# Patient Record
Sex: FEMALE | Race: BLACK OR AFRICAN AMERICAN | NOT HISPANIC OR LATINO | Employment: UNEMPLOYED | URBAN - METROPOLITAN AREA
[De-identification: names, ages, dates, MRNs, and addresses within clinical notes are randomized per-mention and may not be internally consistent; named-entity substitution may affect disease eponyms.]

---

## 2021-09-17 ENCOUNTER — APPOINTMENT (EMERGENCY)
Dept: CT IMAGING | Facility: HOSPITAL | Age: 46
DRG: 988 | End: 2021-09-17

## 2021-09-17 ENCOUNTER — APPOINTMENT (EMERGENCY)
Dept: RADIOLOGY | Facility: HOSPITAL | Age: 46
DRG: 988 | End: 2021-09-17

## 2021-09-17 ENCOUNTER — HOSPITAL ENCOUNTER (INPATIENT)
Facility: HOSPITAL | Age: 46
LOS: 2 days | Discharge: HOME | DRG: 988 | End: 2021-09-20
Attending: EMERGENCY MEDICINE | Admitting: INTERNAL MEDICINE

## 2021-09-17 DIAGNOSIS — R63.4 UNINTENTIONAL WEIGHT LOSS: ICD-10-CM

## 2021-09-17 DIAGNOSIS — R74.01 TRANSAMINITIS: ICD-10-CM

## 2021-09-17 DIAGNOSIS — J18.9 PNEUMONIA: ICD-10-CM

## 2021-09-17 DIAGNOSIS — R59.1 DIFFUSE LYMPHADENOPATHY: ICD-10-CM

## 2021-09-17 DIAGNOSIS — R91.8 BILATERAL PULMONARY INFILTRATES ON CHEST X-RAY: Primary | ICD-10-CM

## 2021-09-17 DIAGNOSIS — E04.1 THYROID NODULE GREATER THAN OR EQUAL TO 1.5 CM IN DIAMETER INCIDENTALLY NOTED ON IMAGING STUDY: ICD-10-CM

## 2021-09-17 DIAGNOSIS — R53.1 GENERALIZED WEAKNESS: ICD-10-CM

## 2021-09-17 LAB
ALBUMIN SERPL BCP-MCNC: 2.4 G/DL (ref 3.5–5)
ALP SERPL-CCNC: 187 U/L (ref 46–116)
ALT SERPL W P-5'-P-CCNC: 79 U/L (ref 12–78)
ANION GAP SERPL CALCULATED.3IONS-SCNC: 6 MMOL/L (ref 4–13)
ANISOCYTOSIS BLD QL SMEAR: PRESENT
AST SERPL W P-5'-P-CCNC: 240 U/L (ref 5–45)
BACTERIA UR QL AUTO: NORMAL /HPF
BASOPHILS # BLD MANUAL: 0 THOUSAND/UL (ref 0–0.1)
BASOPHILS NFR MAR MANUAL: 0 % (ref 0–1)
BILIRUB SERPL-MCNC: 0.37 MG/DL (ref 0.2–1)
BILIRUB UR QL STRIP: NEGATIVE
BUN SERPL-MCNC: 11 MG/DL (ref 5–25)
CALCIUM ALBUM COR SERPL-MCNC: 10.1 MG/DL (ref 8.3–10.1)
CALCIUM SERPL-MCNC: 8.8 MG/DL (ref 8.3–10.1)
CHLORIDE SERPL-SCNC: 106 MMOL/L (ref 100–108)
CK SERPL-CCNC: 197 U/L (ref 26–192)
CLARITY UR: CLEAR
CO2 SERPL-SCNC: 27 MMOL/L (ref 21–32)
COLOR UR: YELLOW
CREAT SERPL-MCNC: 0.77 MG/DL (ref 0.6–1.3)
D DIMER PPP FEU-MCNC: 3.73 UG/ML FEU
DACRYOCYTES BLD QL SMEAR: PRESENT
EOSINOPHIL # BLD MANUAL: 0 THOUSAND/UL (ref 0–0.4)
EOSINOPHIL NFR BLD MANUAL: 0 % (ref 0–6)
ERYTHROCYTE [DISTWIDTH] IN BLOOD BY AUTOMATED COUNT: 25.6 % (ref 11.6–15.1)
EXT PREG TEST URINE: NEGATIVE
EXT. CONTROL ED NAV: NORMAL
GFR SERPL CREATININE-BSD FRML MDRD: 106 ML/MIN/1.73SQ M
GLUCOSE SERPL-MCNC: 88 MG/DL (ref 65–140)
GLUCOSE UR STRIP-MCNC: NEGATIVE MG/DL
HCT VFR BLD AUTO: 28.8 % (ref 34.8–46.1)
HGB BLD-MCNC: 9.4 G/DL (ref 11.5–15.4)
HGB UR QL STRIP.AUTO: ABNORMAL
KETONES UR STRIP-MCNC: NEGATIVE MG/DL
LEUKOCYTE ESTERASE UR QL STRIP: NEGATIVE
LIPASE SERPL-CCNC: 431 U/L (ref 73–393)
LYMPHOCYTES # BLD AUTO: 0.79 THOUSAND/UL (ref 0.6–4.47)
LYMPHOCYTES # BLD AUTO: 30 % (ref 14–44)
MCH RBC QN AUTO: 30.3 PG (ref 26.8–34.3)
MCHC RBC AUTO-ENTMCNC: 32.6 G/DL (ref 31.4–37.4)
MCV RBC AUTO: 93 FL (ref 82–98)
MONOCYTES # BLD AUTO: 0.13 THOUSAND/UL (ref 0–1.22)
MONOCYTES NFR BLD: 5 % (ref 4–12)
NEUTROPHILS # BLD MANUAL: 1.63 THOUSAND/UL (ref 1.85–7.62)
NEUTS SEG NFR BLD AUTO: 62 % (ref 43–75)
NITRITE UR QL STRIP: NEGATIVE
NON-SQ EPI CELLS URNS QL MICRO: NORMAL /HPF
PH UR STRIP.AUTO: 6.5 [PH] (ref 4.5–8)
PLATELET # BLD AUTO: 79 THOUSANDS/UL (ref 149–390)
PLATELET BLD QL SMEAR: ABNORMAL
POTASSIUM SERPL-SCNC: 4.1 MMOL/L (ref 3.5–5.3)
PROT SERPL-MCNC: 8.7 G/DL (ref 6.4–8.2)
PROT UR STRIP-MCNC: NEGATIVE MG/DL
RBC # BLD AUTO: 3.1 MILLION/UL (ref 3.81–5.12)
RBC #/AREA URNS AUTO: NORMAL /HPF
RBC MORPH BLD: PRESENT
ROULEAUX BLD QL SMEAR: PRESENT
SARS-COV-2 RNA RESP QL NAA+PROBE: NEGATIVE
SODIUM SERPL-SCNC: 139 MMOL/L (ref 136–145)
SP GR UR STRIP.AUTO: 1.02 (ref 1–1.03)
TARGETS BLD QL SMEAR: PRESENT
UROBILINOGEN UR QL STRIP.AUTO: 0.2 E.U./DL
VARIANT LYMPHS # BLD AUTO: 3 %
WBC # BLD AUTO: 2.63 THOUSAND/UL (ref 4.31–10.16)
WBC #/AREA URNS AUTO: NORMAL /HPF

## 2021-09-17 PROCEDURE — 80053 COMPREHEN METABOLIC PANEL: CPT

## 2021-09-17 PROCEDURE — 85379 FIBRIN DEGRADATION QUANT: CPT

## 2021-09-17 PROCEDURE — 82550 ASSAY OF CK (CPK): CPT

## 2021-09-17 PROCEDURE — 84166 PROTEIN E-PHORESIS/URINE/CSF: CPT

## 2021-09-17 PROCEDURE — 83690 ASSAY OF LIPASE: CPT

## 2021-09-17 PROCEDURE — 99285 EMERGENCY DEPT VISIT HI MDM: CPT | Performed by: EMERGENCY MEDICINE

## 2021-09-17 PROCEDURE — 71045 X-RAY EXAM CHEST 1 VIEW: CPT

## 2021-09-17 PROCEDURE — 36415 COLL VENOUS BLD VENIPUNCTURE: CPT

## 2021-09-17 PROCEDURE — 81001 URINALYSIS AUTO W/SCOPE: CPT

## 2021-09-17 PROCEDURE — 85027 COMPLETE CBC AUTOMATED: CPT

## 2021-09-17 PROCEDURE — 83605 ASSAY OF LACTIC ACID: CPT

## 2021-09-17 PROCEDURE — 81025 URINE PREGNANCY TEST: CPT

## 2021-09-17 PROCEDURE — 82553 CREATINE MB FRACTION: CPT

## 2021-09-17 PROCEDURE — G1004 CDSM NDSC: HCPCS

## 2021-09-17 PROCEDURE — 99285 EMERGENCY DEPT VISIT HI MDM: CPT

## 2021-09-17 PROCEDURE — 71275 CT ANGIOGRAPHY CHEST: CPT

## 2021-09-17 PROCEDURE — 85007 BL SMEAR W/DIFF WBC COUNT: CPT

## 2021-09-17 PROCEDURE — 87040 BLOOD CULTURE FOR BACTERIA: CPT

## 2021-09-17 PROCEDURE — U0003 INFECTIOUS AGENT DETECTION BY NUCLEIC ACID (DNA OR RNA); SEVERE ACUTE RESPIRATORY SYNDROME CORONAVIRUS 2 (SARS-COV-2) (CORONAVIRUS DISEASE [COVID-19]), AMPLIFIED PROBE TECHNIQUE, MAKING USE OF HIGH THROUGHPUT TECHNOLOGIES AS DESCRIBED BY CMS-2020-01-R: HCPCS

## 2021-09-17 PROCEDURE — 93005 ELECTROCARDIOGRAM TRACING: CPT

## 2021-09-17 PROCEDURE — U0005 INFEC AGEN DETEC AMPLI PROBE: HCPCS

## 2021-09-17 PROCEDURE — 74177 CT ABD & PELVIS W/CONTRAST: CPT

## 2021-09-17 RX ORDER — SODIUM CHLORIDE 9 MG/ML
75 INJECTION, SOLUTION INTRAVENOUS CONTINUOUS
Status: DISCONTINUED | OUTPATIENT
Start: 2021-09-17 | End: 2021-09-20 | Stop reason: HOSPADM

## 2021-09-17 RX ORDER — HYDROMORPHONE HCL/PF 1 MG/ML
0.5 SYRINGE (ML) INJECTION ONCE
Status: COMPLETED | OUTPATIENT
Start: 2021-09-17 | End: 2021-09-18

## 2021-09-18 ENCOUNTER — APPOINTMENT (INPATIENT)
Dept: CT IMAGING | Facility: HOSPITAL | Age: 46
DRG: 988 | End: 2021-09-18

## 2021-09-18 PROBLEM — E04.1 THYROID NODULE GREATER THAN OR EQUAL TO 1.5 CM IN DIAMETER INCIDENTALLY NOTED ON IMAGING STUDY: Status: ACTIVE | Noted: 2021-09-18

## 2021-09-18 PROBLEM — R63.4 UNINTENTIONAL WEIGHT LOSS: Status: ACTIVE | Noted: 2021-09-18

## 2021-09-18 PROBLEM — E87.6 HYPOKALEMIA: Status: ACTIVE | Noted: 2021-09-18

## 2021-09-18 PROBLEM — D61.818 PANCYTOPENIA (HCC): Status: ACTIVE | Noted: 2021-09-18

## 2021-09-18 PROBLEM — R74.01 TRANSAMINITIS: Status: ACTIVE | Noted: 2021-09-18

## 2021-09-18 PROBLEM — R91.8 PULMONARY INFILTRATES: Status: ACTIVE | Noted: 2021-09-18

## 2021-09-18 PROBLEM — R53.1 GENERALIZED WEAKNESS: Status: ACTIVE | Noted: 2021-09-18

## 2021-09-18 LAB
ALBUMIN SERPL BCP-MCNC: 2.4 G/DL (ref 3.5–5)
ALP SERPL-CCNC: 183 U/L (ref 46–116)
ALT SERPL W P-5'-P-CCNC: 76 U/L (ref 12–78)
ANION GAP SERPL CALCULATED.3IONS-SCNC: 7 MMOL/L (ref 4–13)
ANISOCYTOSIS BLD QL SMEAR: PRESENT
APAP SERPL-MCNC: <2 UG/ML (ref 10–20)
AST SERPL W P-5'-P-CCNC: 228 U/L (ref 5–45)
BASOPHILS # BLD MANUAL: 0 THOUSAND/UL (ref 0–0.1)
BASOPHILS NFR MAR MANUAL: 0 % (ref 0–1)
BILIRUB SERPL-MCNC: 0.6 MG/DL (ref 0.2–1)
BUN SERPL-MCNC: 9 MG/DL (ref 5–25)
CALCIUM ALBUM COR SERPL-MCNC: 9.9 MG/DL (ref 8.3–10.1)
CALCIUM SERPL-MCNC: 8.6 MG/DL (ref 8.3–10.1)
CHLORIDE SERPL-SCNC: 106 MMOL/L (ref 100–108)
CK MB SERPL-MCNC: 1.9 % (ref 0–2.5)
CK MB SERPL-MCNC: 3.8 NG/ML (ref 0–5)
CO2 SERPL-SCNC: 26 MMOL/L (ref 21–32)
CREAT SERPL-MCNC: 0.67 MG/DL (ref 0.6–1.3)
CRP SERPL QL: 18.2 MG/L
EOSINOPHIL # BLD MANUAL: 0 THOUSAND/UL (ref 0–0.4)
EOSINOPHIL NFR BLD MANUAL: 0 % (ref 0–6)
ERYTHROCYTE [DISTWIDTH] IN BLOOD BY AUTOMATED COUNT: 25.5 % (ref 11.6–15.1)
ERYTHROCYTE [SEDIMENTATION RATE] IN BLOOD: 106 MM/HOUR (ref 0–19)
ETHANOL SERPL-MCNC: <3 MG/DL (ref 0–3)
GFR SERPL CREATININE-BSD FRML MDRD: 120 ML/MIN/1.73SQ M
GLUCOSE SERPL-MCNC: 78 MG/DL (ref 65–140)
HAV IGM SER QL: NORMAL
HBV CORE IGM SER QL: NORMAL
HBV SURFACE AG SER QL: NORMAL
HCT VFR BLD AUTO: 30.8 % (ref 34.8–46.1)
HCV AB SER QL: NORMAL
HGB BLD-MCNC: 10 G/DL (ref 11.5–15.4)
HIV 1+2 AB+HIV1 P24 AG SERPL QL IA: NORMAL
HIV1 P24 AG SER QL: NORMAL
HYPERCHROMIA BLD QL SMEAR: PRESENT
LACTATE SERPL-SCNC: 0.9 MMOL/L (ref 0.5–2)
LYMPHOCYTES # BLD AUTO: 0.94 THOUSAND/UL (ref 0.6–4.47)
LYMPHOCYTES # BLD AUTO: 36 % (ref 14–44)
MCH RBC QN AUTO: 30.6 PG (ref 26.8–34.3)
MCHC RBC AUTO-ENTMCNC: 32.5 G/DL (ref 31.4–37.4)
MCV RBC AUTO: 94 FL (ref 82–98)
MONOCYTES # BLD AUTO: 0.13 THOUSAND/UL (ref 0–1.22)
MONOCYTES NFR BLD: 5 % (ref 4–12)
NEUTROPHILS # BLD MANUAL: 1.49 THOUSAND/UL (ref 1.85–7.62)
NEUTS BAND NFR BLD MANUAL: 1 % (ref 0–8)
NEUTS SEG NFR BLD AUTO: 56 % (ref 43–75)
NRBC BLD AUTO-RTO: 1 /100 WBC (ref 0–2)
PLATELET # BLD AUTO: 76 THOUSANDS/UL (ref 149–390)
PLATELET # BLD AUTO: 79 THOUSANDS/UL (ref 149–390)
PLATELET BLD QL SMEAR: ABNORMAL
POIKILOCYTOSIS BLD QL SMEAR: PRESENT
POTASSIUM SERPL-SCNC: 3.3 MMOL/L (ref 3.5–5.3)
PROT SERPL-MCNC: 8.5 G/DL (ref 6.4–8.2)
RBC # BLD AUTO: 3.27 MILLION/UL (ref 3.81–5.12)
RBC MORPH BLD: PRESENT
SODIUM SERPL-SCNC: 139 MMOL/L (ref 136–145)
TARGETS BLD QL SMEAR: PRESENT
TROPONIN I SERPL-MCNC: <0.02 NG/ML
TSH SERPL DL<=0.05 MIU/L-ACNC: 1.64 UIU/ML (ref 0.36–3.74)
VARIANT LYMPHS # BLD AUTO: 2 %
WBC # BLD AUTO: 2.61 THOUSAND/UL (ref 4.31–10.16)

## 2021-09-18 PROCEDURE — 84145 PROCALCITONIN (PCT): CPT

## 2021-09-18 PROCEDURE — 85652 RBC SED RATE AUTOMATED: CPT

## 2021-09-18 PROCEDURE — 80074 ACUTE HEPATITIS PANEL: CPT

## 2021-09-18 PROCEDURE — 86592 SYPHILIS TEST NON-TREP QUAL: CPT

## 2021-09-18 PROCEDURE — 86644 CMV ANTIBODY: CPT

## 2021-09-18 PROCEDURE — 86431 RHEUMATOID FACTOR QUANT: CPT

## 2021-09-18 PROCEDURE — 82077 ASSAY SPEC XCP UR&BREATH IA: CPT

## 2021-09-18 PROCEDURE — 86645 CMV ANTIBODY IGM: CPT

## 2021-09-18 PROCEDURE — 85007 BL SMEAR W/DIFF WBC COUNT: CPT

## 2021-09-18 PROCEDURE — 86480 TB TEST CELL IMMUN MEASURE: CPT

## 2021-09-18 PROCEDURE — 99223 1ST HOSP IP/OBS HIGH 75: CPT | Performed by: INTERNAL MEDICINE

## 2021-09-18 PROCEDURE — 86430 RHEUMATOID FACTOR TEST QUAL: CPT

## 2021-09-18 PROCEDURE — 87806 HIV AG W/HIV1&2 ANTB W/OPTIC: CPT

## 2021-09-18 PROCEDURE — 84439 ASSAY OF FREE THYROXINE: CPT

## 2021-09-18 PROCEDURE — 86334 IMMUNOFIX E-PHORESIS SERUM: CPT | Performed by: PATHOLOGY

## 2021-09-18 PROCEDURE — 86038 ANTINUCLEAR ANTIBODIES: CPT

## 2021-09-18 PROCEDURE — 84484 ASSAY OF TROPONIN QUANT: CPT

## 2021-09-18 PROCEDURE — 86334 IMMUNOFIX E-PHORESIS SERUM: CPT

## 2021-09-18 PROCEDURE — 86747 PARVOVIRUS ANTIBODY: CPT

## 2021-09-18 PROCEDURE — 96374 THER/PROPH/DIAG INJ IV PUSH: CPT

## 2021-09-18 PROCEDURE — 86780 TREPONEMA PALLIDUM: CPT

## 2021-09-18 PROCEDURE — 84165 PROTEIN E-PHORESIS SERUM: CPT

## 2021-09-18 PROCEDURE — 86593 SYPHILIS TEST NON-TREP QUANT: CPT

## 2021-09-18 PROCEDURE — 85049 AUTOMATED PLATELET COUNT: CPT

## 2021-09-18 PROCEDURE — 80143 DRUG ASSAY ACETAMINOPHEN: CPT

## 2021-09-18 PROCEDURE — 84165 PROTEIN E-PHORESIS SERUM: CPT | Performed by: PATHOLOGY

## 2021-09-18 PROCEDURE — 80053 COMPREHEN METABOLIC PANEL: CPT

## 2021-09-18 PROCEDURE — 88184 FLOWCYTOMETRY/ TC 1 MARKER: CPT | Performed by: INTERNAL MEDICINE

## 2021-09-18 PROCEDURE — 86140 C-REACTIVE PROTEIN: CPT

## 2021-09-18 PROCEDURE — 84443 ASSAY THYROID STIM HORMONE: CPT

## 2021-09-18 PROCEDURE — 84166 PROTEIN E-PHORESIS/URINE/CSF: CPT | Performed by: PATHOLOGY

## 2021-09-18 PROCEDURE — 86665 EPSTEIN-BARR CAPSID VCA: CPT

## 2021-09-18 PROCEDURE — 85027 COMPLETE CBC AUTOMATED: CPT

## 2021-09-18 PROCEDURE — 86039 ANTINUCLEAR ANTIBODIES (ANA): CPT

## 2021-09-18 PROCEDURE — 96361 HYDRATE IV INFUSION ADD-ON: CPT

## 2021-09-18 PROCEDURE — 36415 COLL VENOUS BLD VENIPUNCTURE: CPT

## 2021-09-18 PROCEDURE — 86664 EPSTEIN-BARR NUCLEAR ANTIGEN: CPT

## 2021-09-18 PROCEDURE — 86663 EPSTEIN-BARR ANTIBODY: CPT

## 2021-09-18 RX ORDER — ACETAMINOPHEN 325 MG/1
650 TABLET ORAL EVERY 6 HOURS PRN
Status: DISCONTINUED | OUTPATIENT
Start: 2021-09-18 | End: 2021-09-20 | Stop reason: HOSPADM

## 2021-09-18 RX ORDER — POTASSIUM CHLORIDE 20 MEQ/1
40 TABLET, EXTENDED RELEASE ORAL ONCE
Status: COMPLETED | OUTPATIENT
Start: 2021-09-18 | End: 2021-09-18

## 2021-09-18 RX ORDER — OXYCODONE HYDROCHLORIDE 5 MG/1
2.5 TABLET ORAL EVERY 6 HOURS PRN
Status: DISCONTINUED | OUTPATIENT
Start: 2021-09-18 | End: 2021-09-20 | Stop reason: HOSPADM

## 2021-09-18 RX ORDER — GABAPENTIN 100 MG/1
100 CAPSULE ORAL 3 TIMES DAILY
Status: DISCONTINUED | OUTPATIENT
Start: 2021-09-18 | End: 2021-09-20 | Stop reason: HOSPADM

## 2021-09-18 RX ADMIN — Medication 1000 MG: at 05:12

## 2021-09-18 RX ADMIN — SODIUM CHLORIDE 1000 ML: 0.9 INJECTION, SOLUTION INTRAVENOUS at 01:16

## 2021-09-18 RX ADMIN — OXYCODONE HYDROCHLORIDE 2.5 MG: 5 TABLET ORAL at 14:10

## 2021-09-18 RX ADMIN — ACETAMINOPHEN 650 MG: 325 TABLET, FILM COATED ORAL at 22:09

## 2021-09-18 RX ADMIN — IOHEXOL 100 ML: 350 INJECTION, SOLUTION INTRAVENOUS at 01:57

## 2021-09-18 RX ADMIN — POTASSIUM CHLORIDE 40 MEQ: 1500 TABLET, EXTENDED RELEASE ORAL at 09:30

## 2021-09-18 RX ADMIN — ACETAMINOPHEN 650 MG: 325 TABLET, FILM COATED ORAL at 14:10

## 2021-09-18 RX ADMIN — GABAPENTIN 100 MG: 100 CAPSULE ORAL at 20:26

## 2021-09-18 RX ADMIN — SODIUM CHLORIDE 125 ML/HR: 0.9 INJECTION, SOLUTION INTRAVENOUS at 05:12

## 2021-09-18 RX ADMIN — HYDROMORPHONE HYDROCHLORIDE 0.5 MG: 1 INJECTION, SOLUTION INTRAMUSCULAR; INTRAVENOUS; SUBCUTANEOUS at 01:12

## 2021-09-18 RX ADMIN — DOXYCYCLINE 100 MG: 100 INJECTION, POWDER, LYOPHILIZED, FOR SOLUTION INTRAVENOUS at 06:00

## 2021-09-18 RX ADMIN — GABAPENTIN 100 MG: 100 CAPSULE ORAL at 15:18

## 2021-09-18 NOTE — ASSESSMENT & PLAN NOTE
CTA chest/abd/pelvis: Bilateral upper lobe infiltrates larger on the right  Unclear if this is infectious in nature  Patient endorses mild cough and congestion for the past few days  Patient did receive ceftriaxone and doxycycline in the ED       Plan:  - hold antibiotics for now   - monitor for fever  - f/u quant gold

## 2021-09-18 NOTE — ED ATTENDING ATTESTATION
2021  IKirt MD, saw and evaluated the patient  I have discussed the patient with the resident/non-physician practitioner and agree with the resident's/non-physician practitioner's findings, Plan of Care, and MDM as documented in the resident's/non-physician practitioner's note, except where noted  All available labs and Radiology studies were reviewed  I was present for key portions of any procedure(s) performed by the resident/non-physician practitioner and I was immediately available to provide assistance  At this point I agree with the current assessment done in the Emergency Department  I have conducted an independent evaluation of this patient a history and physical is as follows: Patient is a 48year old female with worsening diffuse aches and pains since this past Tuesday with abdominal pain and rash of R hand  Is visiting her from [facility] recently  No cough  No sob or chest pain  No fever  No known bites  Has had prior   LMP - July  No recent old records from this ED seen on computer system  [facility] on this patient and no Rx found  (+) tachy  No murmur  (+) dark rash noted palmar aspect of R hand  Lungs clear  Abdomen diffusely tender  Good bowel sounds  No LE edema  DDx including but not limited to: appendicitis, gastroenteritis, gastritis, PUD, GERD, gastroparesis, hepatitis, pancreatitis, colitis, enteritis, diverticulitis, food poisoning, mesenteric adenitis, mesenteric ischemia, IBD, IBS, ileus, bowel obstruction, volvulus, internal hernia, AAA, cholecystitis, biliary colic, choledocholithiasis, perforated viscus, tumor, splenic etiology, constipation, pelvic pathology, renal colic, pyelonephritis, UTI, PE, endocarditis, metabolic abnormality, rhabdomyolysis, arthritis, myositis, fibromyalgia, COVID 23; doubt cardiac etiology or Lyme disease or septic arthritis or meningitis or meningococcemia   Will check labs and CXR and EKG and CT and give pain medication       ED Course         Critical Care Time  Procedures

## 2021-09-18 NOTE — ASSESSMENT & PLAN NOTE
WBC 2 63, Hgb 9 4, platelet 79  Patient with no active signs of bleeding, no petechiae or purpura  Unknown etiology, need to rule out hematologic malignancy, infectious etiology, autoimmune etiology    Plan:  - trend CBC  - monitor H&H and transfuse for hgb <7

## 2021-09-18 NOTE — H&P
[facility]  [facility]- Michell Ruiz 1975, 48 y o  female MRN: [id number]  Unit/Bed#: ED 08 Encounter: [id number]  Primary Care Provider: No primary care provider on file  Date and time admitted to hospital: 9/17/2021  8:48 PM    * Generalized weakness  Assessment & Plan  Patient endorses 20 lb weigh loss over last year  Also endorses night sweats for the last few months and significant hair loss  Flew to the [address] from her home country of [place] [facility] on Monday to visit her cousin, with whom she is staying  On Tuesday after arriving here, patient developed arthralgias of the bilateral hands, wrists, toes, and left knee  No joint swelling on exam though significant tenderness of MCPs, wrists  Developed concurrent abdominal pain of the lower abdomen that she describes as cramping in nature  Denies any diarrhea, constipation, abnormal vaginal bleeding or discharge  Additionally, at this time the patient developed generalized weakness so severe that she sustained a fall yesterday with headstrike of the left temporal region, no loss of consciousness  Since that fall, the patient has been suffering from left sided headaches  Endorses mild cough and congestion for the last few days  Is fully vaccinated against COVID  Unsure of her childhood vaccine status  At this point, the etiology of the patient's symptoms is unclear  Patient is pancytopenic with WBC 2 63, Hgb 9 4, platelet 79  Mild transaminitis with , ALT 79,   Serum protein elevated at 8 7 with low serum albumin of 2 4  Positive D-dimer 3 73  CTA chest/abdomen/pelvis with no evidence of PE, though adenopathy is seen throughout the visualized anatomy including in the bilateral axilla, bilateral hilar, mediastinum, retroperitoneum, and bilateral iliac chain of unknown origin       Differential includes infectious etiology such as hepatitis, parvovirus B19, tuberculosis, EBV, CMV, HIV; autoimmune disease including but not limited to SLE, sarcoidosis, rheumatoid arthritis; and strong suspicion for possible hematologic malignancy given presence of B symptoms and diffuse lymphadenopathy  Patient with recent flight, decompression disease (Caison disease) could be contributing to arthralgias and abdominal pain  Plan:  - IVF with NS @ 75 cc/hr  - CT head without contrast due to headstrike, need to r/o intracranial pathology   - Infectious workup: EBV, CMP, hepatitis panel, RPR, HIV, quantiferon gold  - Autoimmune workup: CRP, ESR, PRATEEK, rheumatoid factor  - Neoplasm workup: SPEP, UPEP  - consider consultation of hematology-oncology, infectious disease for further evaluation       Pancytopenia ([facility] [address] )  Assessment & Plan  WBC 2 63, Hgb 9 4, platelet 79  Patient with no active signs of bleeding, no petechiae or purpura  Unknown etiology, need to rule out hematologic malignancy, infectious etiology, autoimmune etiology    Plan:  - trend CBC  - monitor H&H and transfuse for hgb <7    Pulmonary infiltrates  Assessment & Plan  CTA chest/abd/pelvis: Bilateral upper lobe infiltrates larger on the right  Unclear if this is infectious in nature  Patient endorses mild cough and congestion for the past few days  Patient did receive ceftriaxone and doxycycline in the ED  Plan:  - hold antibiotics for now   - monitor for fever  - f/u quant gold     Transaminitis  Assessment & Plan  , ALT 79,   Patient reports no significant alcohol use   Has been taking herbal supplement since March 2021, unclear what is in the supplement, has not taken in a few weeks  Plan:  - monitor CMP   - avoid hepatotoxins   - hepatitis panel     Unintentional weight loss  Assessment & Plan  Patient reports unintentional weight loss of approximately 20 lbs over the past year  Began seeing an herbal specialist in her home country of [place] [facility] for these symptoms and was given an herbal supplement that she started taking in March of 2021   Unsure what is in the herbal supplement, but has not taken it over a few weeks  Endorses poor appetite prior to arrival in the [address] on Monday  Per her cousin at bedside, she has had a good appetite since arriving here  Plan:  - nutrition consult     Thyroid nodule greater than or equal to 1 5 cm in diameter incidentally noted on imaging study  Assessment & Plan  3 4 x 2 5 cm nodule in the right thyroid lobe  Incidental discovery of one or more thyroid nodule(s) measuring more than 1 5 cm and without suspicious features is noted in this patient who is above 28years old; according to guidelines published in the   February 2015 white paper on incidental thyroid nodules in the Journal of the [employer] of Radiology [facility]), further characterization with thyroid ultrasound is recommended  Plan:  - f/u TSH and free T4  - thyroid u/s outpatient    VTE Pharmacologic Prophylaxis: VTE Score: 3 Moderate Risk (Score 3-4) - Pharmacological DVT Prophylaxis Ordered: enoxaparin (Lovenox)  Code Status: Level 1 - Full Code   Discussion with family: Updated  (cousin Lori Mar ) at bedside  Anticipated Length of Stay: Patient will be admitted on an inpatient basis with an anticipated length of stay of greater than 2 midnights secondary to Complex clinical picture  Chief Complaint: generalized weakness, joint pains, abdominal pain     History of Present Illness:  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 48 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  female with no significant PMH who presents with new onset arthralgias, crampy abdominal pain, and generalized weakness since arriving in the [address] from [place], where she resides  Patient endorses 20 lb unintentional weight loss over last year  Also endorses night sweats for the last few months and significant hair loss  Flew to the [address] from her home country of [place] on Monday to visit her cousin, with whom she is staying  On Tuesday after arriving here, patient developed arthralgias of the bilateral hands, wrists, toes, and left knee  No joint swelling on exam though significant tenderness of MCPs, wrists  Also endorses numbness and tingling of her extremities and painless hyperpigmented lesions on the right palm and thumb  Additionally, patient endorses "darkening" of the skin of her ears  Developed concurrent abdominal pain of the lower abdomen that she describes as cramping in nature  Denies any diarrhea, constipation, abnormal vaginal bleeding or discharge  Additionally, at this time the patient developed generalized weakness so severe that she sustained a fall yesterday with headstrike of the left temporal region, no loss of consciousness  Since that fall, the patient has been suffering from left sided headaches  Endorses mild cough and congestion for the last few days  Is fully vaccinated against COVID  Unsure of her childhood vaccine status  When she first began to experience B symptoms approximately one year ago, she began visiting an herbal practitioner in [facility]  They placed her on an herbal supplement which she has been taking daily up until a few weeks ago  They also recommended she adhere to a gluten free and vegetarian diet, which she has been following for the last few months  She does note that her weight loss began prior to making these dietary changes  At this point, the etiology of the patient's symptoms is unclear  Patient is pancytopenic with WBC 2 63, Hgb 9 4, platelet 79  Mild transaminitis with , ALT 79,   Serum protein elevated at 8 7 with low serum albumin of 2 4  Positive D-dimer 3 73  CTA chest/abdomen/pelvis with no evidence of PE, though adenopathy is seen throughout the visualized anatomy including in the bilateral axilla, bilateral hilar, mediastinum, retroperitoneum, and bilateral iliac chain of unknown origin  Review of Systems:  Review of Systems   Constitutional: Positive for appetite change, chills (at night), diaphoresis (at night), fatigue and unexpected weight change   Negative for fever    HENT: Positive for congestion  Negative for ear pain and sore throat  Eyes: Negative for pain, discharge and visual disturbance  Respiratory: Positive for cough  Negative for chest tightness and shortness of breath  Cardiovascular: Negative for chest pain, palpitations and leg swelling  Gastrointestinal: Positive for abdominal pain  Negative for abdominal distention, blood in stool, constipation, diarrhea, nausea and vomiting  Endocrine: Negative for cold intolerance and heat intolerance  Genitourinary: Negative for decreased urine volume, difficulty urinating, dysuria, flank pain, frequency, menstrual problem, pelvic pain and urgency  Musculoskeletal: Positive for arthralgias  Negative for back pain, gait problem, joint swelling, myalgias, neck pain and neck stiffness  Skin: Positive for color change (hyperpigmented macules of right palm and thumb)  Neurological: Positive for light-headedness and headaches  Negative for dizziness, syncope, weakness and numbness  Hematological: Negative for adenopathy  Does not bruise/bleed easily  Psychiatric/Behavioral: Negative for confusion, decreased concentration and hallucinations  All other systems reviewed and are negative  Past Medical and Surgical History:   Past Medical History:   Diagnosis Date    Hypertension        History reviewed  No pertinent surgical history  Meds/Allergies:  Prior to Admission medications    Not on File     I have reviewed home medications with patient personally      Allergies: No Known Allergies    Social History:  Marital Status: /Civil Union   Occupation: on vacation  Patient Pre-hospital Living Situation: Home, With other family member: currently living with cousin whom she is visiting  Patient Pre-hospital Level of Mobility: walks  Patient Pre-hospital Diet Restrictions: gluten free, vegetarian   Substance Use History:   Social History     Substance and Sexual Activity   Alcohol Use None Social History     Tobacco Use   Smoking Status Never Smoker   Smokeless Tobacco Never Used     Social History     Substance and Sexual Activity   Drug Use Not on file       Family History:  History reviewed  No pertinent family history  Physical Exam:     Vitals:   Blood Pressure: 116/74 (09/18/21 0600)  Pulse: 88 (09/18/21 0600)  Temperature: 98 9 °F (37 2 °C) (09/17/21 2324)  Temp Source: Oral (09/17/21 2324)  Respirations: 18 (09/18/21 0600)  Height: 5' 5" (165 1 cm) (09/17/21 1915)  Weight - Scale: 58 1 kg (128 lb) (09/17/21 1915)  SpO2: 98 % (09/18/21 0600)    Physical Exam  Constitutional:       General: She is not in acute distress  Appearance: Normal appearance  She is not ill-appearing, toxic-appearing or diaphoretic  HENT:      Head: Normocephalic  Comments: Ecchymosis of left frontotemporal region     Nose: Nose normal  No congestion or rhinorrhea  Mouth/Throat:      Mouth: Mucous membranes are moist       Pharynx: Oropharynx is clear  Eyes:      Extraocular Movements: Extraocular movements intact  Pupils: Pupils are equal, round, and reactive to light  Cardiovascular:      Rate and Rhythm: Normal rate and regular rhythm  Pulses: Normal pulses  Heart sounds: Normal heart sounds  No murmur heard  No friction rub  No gallop  Pulmonary:      Effort: Pulmonary effort is normal  No respiratory distress  Breath sounds: Normal breath sounds  No wheezing, rhonchi or rales  Abdominal:      General: Abdomen is flat  Bowel sounds are normal  There is no distension  Palpations: Abdomen is soft  There is no mass  Tenderness: There is abdominal tenderness (to deep palpation of suprapubic region)  There is no right CVA tenderness, left CVA tenderness, guarding or rebound  Musculoskeletal:         General: Tenderness (of b/l MCP, MTP, left knee]) present  No swelling or deformity  Normal range of motion  Right lower leg: No edema        Left lower leg: No edema  Skin:     General: Skin is warm and dry  Capillary Refill: Capillary refill takes less than 2 seconds  Coloration: Skin is not jaundiced  Findings: Rash (hyperpigmentated macules of left palm and thumb) present  No bruising or lesion  Neurological:      General: No focal deficit present  Mental Status: She is alert and oriented to person, place, and time  Cranial Nerves: No cranial nerve deficit  Motor: No weakness  Coordination: Coordination normal       Deep Tendon Reflexes: Reflexes normal    Psychiatric:         Mood and Affect: Mood normal          Behavior: Behavior normal          Thought Content: Thought content normal          Judgment: Judgment normal         Additional Data:     Lab Results:  Results from last 7 days   Lab Units 09/17/21  2236   WBC Thousand/uL 2 63*   HEMOGLOBIN g/dL 9 4*   HEMATOCRIT % 28 8*   PLATELETS Thousands/uL 79*   LYMPHO PCT % 30   MONO PCT % 5   EOS PCT % 0     Results from last 7 days   Lab Units 09/17/21  2236   SODIUM mmol/L 139   POTASSIUM mmol/L 4 1   CHLORIDE mmol/L 106   CO2 mmol/L 27   BUN mg/dL 11   CREATININE mg/dL 0 77   ANION GAP mmol/L 6   CALCIUM mg/dL 8 8   ALBUMIN g/dL 2 4*   TOTAL BILIRUBIN mg/dL 0 37   ALK PHOS U/L 187*   ALT U/L 79*   AST U/L 240*   GLUCOSE RANDOM mg/dL 88     Results from last 7 days   Lab Units 09/17/21  2325   LACTIC ACID mmol/L 0 9       Imaging: Reviewed radiology reports from this admission including: chest CT scan, abdominal/pelvic CT and xray(s)  PE Study with CT Abdomen and Pelvis with contrast   ED Interpretation by Marion Kay DO (09/18 9181)   Adenopathy is seen throughout the visualized anatomy including in the bilateral axilla, bilateral divya, mediastinum, retroperitoneum, and bilateral iliac chain of unknown origin  Recommend clinical correlation      3 4 x 2 5 cm nodule in the right thyroid lobe    Incidental discovery of one or more thyroid nodule(s) measuring more than 1 5 cm and without suspicious features is noted in this patient who is above 28years old; according to guidelines published in the   February 2015 white paper on incidental thyroid nodules in the Journal of the [employer] of Radiology [facility]), further characterization with thyroid ultrasound is recommended      Bilateral upper lobe infiltrates larger on the right          No pulmonary embolus is seen  No aortic aneurysm or dissection      Final Result by Roger Hartmann MD (09/18 0342)      Adenopathy is seen throughout the visualized anatomy including in the bilateral axilla, bilateral divya, mediastinum, retroperitoneum, and bilateral iliac chain of unknown origin  Recommend clinical correlation  3 4 x 2 5 cm nodule in the right thyroid lobe  Incidental discovery of one or more thyroid nodule(s) measuring more than 1 5 cm and without suspicious features is noted in this patient who is above 28years old; according to guidelines published in the    February 2015 white paper on incidental thyroid nodules in the Journal of the [employer] of Radiology [facility]), further characterization with thyroid ultrasound is recommended  Bilateral upper lobe infiltrates larger on the right           No pulmonary embolus is seen  No aortic aneurysm or dissection             I personally discussed this study with Saud Sher on 9/18/2021 at 3:42 AM       XR chest 1 view portable   ED Interpretation by Yolie Tomas DO (09/18 6543)   Trachea midline, no bony abnormalities, cardiac silhouette is appropriately sized, costophrenic angles are well visualized as well as lung markings  Appears to be normal chest x-ray  EKG and Other Studies Reviewed on Admission:   · EKG: NSR  HR 98     ** Please Note: This note has been constructed using a voice recognition system   **

## 2021-09-18 NOTE — ASSESSMENT & PLAN NOTE
Patient endorses 20 lb weight loss over last year  Also endorses night sweats for the last few months and significant hair loss  Flew to the [address] from her home country of [place] [facility] on Monday to visit her cousin, with whom she is staying  On Tuesday after arriving here, patient developed arthralgias of the bilateral hands, wrists, toes, and left knee  No joint swelling on exam though significant tenderness of MCPs, wrists  Developed concurrent abdominal pain of the lower abdomen that she describes as cramping in nature  Denies any diarrhea, constipation, abnormal vaginal bleeding or discharge  Additionally, at this time the patient developed generalized weakness so severe that she sustained a fall yesterday with headstrike of the left temporal region, no loss of consciousness  Since that fall, the patient has been suffering from left sided headaches  Endorses mild cough and congestion for the last few days  Is fully vaccinated against COVID  Unsure of her childhood vaccine status  At this point, the etiology of the patient's symptoms is unclear  Patient is pancytopenic with WBC 2 63, Hgb 9 4, platelet 79  Mild transaminitis with , ALT 79,   Serum protein elevated at 8 7 with low serum albumin of 2 4  Positive D-dimer 3 73  CTA chest/abdomen/pelvis with no evidence of PE, though adenopathy is seen throughout the visualized anatomy including in the bilateral axilla, bilateral hilar, mediastinum, retroperitoneum, and bilateral iliac chain of unknown origin  Differential includes infectious etiology such as hepatitis, parvovirus B19, tuberculosis, EBV, CMV, HIV; autoimmune disease including but not limited to SLE, sarcoidosis, rheumatoid arthritis; and strong suspicion for possible hematologic malignancy given presence of B symptoms and diffuse lymphadenopathy  Patient with recent flight, decompression disease (Caison disease) could be contributing to arthralgias and abdominal pain       Plan:  - IVF with NS @ 75 cc/hr  - CT head without contrast due to headstrike, need to r/o intracranial pathology   - Infectious workup: EBV, CMP, hepatitis panel, RPR, HIV, quantiferon gold  - Autoimmune workup: CRP, ESR, PRATEEK, rheumatoid factor  - Neoplasm workup: SPEP, UPEP  - consider consultation of hematology-oncology, infectious disease for further evaluation

## 2021-09-18 NOTE — ED NOTES
Marty text sent to Dr Marjorie Drake, pt complains of pain 6/10 in b/l hands and legs  Notified him pt requesting pain medication        Franck Chandler RN  09/18/21 6986

## 2021-09-18 NOTE — ED NOTES
Pt eating dinner with no difficulties  Daughter at bedside  Pt and family have no needs at this time        Stefanie Jacob RN  09/18/21 2636

## 2021-09-18 NOTE — ED PROCEDURE NOTE
PROCEDURE  US Guided Peripheral IV    Date/Time: 9/18/2021 1:06 AM  Performed by: Valdez Blanca DO  Authorized by: Valdez Blanca DO     Patient location:  ED  Performed by: Attending  Other Assisting Provider: No    Indications:     Indications: difficulty obtaining IV access    Procedure details:     Patient evaluated for contraindications to access (i e  fistula, thrombosis, etc): Yes      Standard clean technique used for ultrasound access: Yes      Location:  Left antecubital    Catheter size:  20 gauge    Number of attempts:  1    Successful placement: yes    Post-procedure details:     Post-procedure:  Dressing applied    Assessment: free fluid flow and no signs of infiltration      Post-procedure complications: none      Patient tolerance of procedure:   Tolerated well, no immediate complications         Valdez Blanca DO  09/18/21 0107

## 2021-09-18 NOTE — ASSESSMENT & PLAN NOTE
, ALT 79,   Patient reports no significant alcohol use   Has been taking herbal supplement since March 2021, unclear what is in the supplement, has not taken in a few weeks       Plan:  - monitor CMP   - avoid hepatotoxins   - hepatitis panel

## 2021-09-18 NOTE — ED NOTES
Pt fluids and medication delayed due to lack of IV access  Pt hard stick, resident in at this time performing US IV insertion       Carmen Rubalcava, DIOGENES  09/18/21 0052

## 2021-09-18 NOTE — ASSESSMENT & PLAN NOTE
Patient reports unintentional weight loss of approximately 20 lbs over the past year  Began seeing an herbal specialist in her home country of [facility] for these symptoms and was given an herbal supplement that she started taking in March of 2021  Unsure what is in the herbal supplement, but has not taken it over a few weeks  Endorses poor appetite prior to arrival in the [address] on Monday  Per her cousin at bedside, she has had a good appetite since arriving here  Herbalist also recommended the patient adhere to a vegetarian and gluten free diet, which the patient has been compliant with  States her weight loss began prior to making dietary changes       Plan:  - nutrition consult

## 2021-09-18 NOTE — ED PROVIDER NOTES
History  Chief Complaint   Patient presents with    Pain     Pt c/o bilateral hand pain, abd pain, and left foot pain since arriving on Tuesday  States she fell yesterday bc of pain, + headstrike, - LOC, - thinners  Patient is a 20-year-old female with no significant past medical history who reports the emergency department with complaint of extremity pain and abdominal pain  The patient reports that she flew here from [facility] on Monday to visit her son  She says that she received a COVID screen on Saturday that was negative and felt fine all day Monday after arriving  On Tuesday she began to notice a burning pain in her upper and lower extremities especially in the distal portion of her extremities  She also reported a small nodule that appeared on her right palm and strange discoloration of her digits in both upper extremities  She reports that the distal portion of her lower extremities have begun to swell and she says that there is numbness and tingling in the distal portion of her extremities  She rates this pain as 6/10  The patient also reports abdominal pain that she describes as cramping pain and rates it a 6/10 as well  The patient then reports that yesterday she sustained a fall secondary to the pain in her lower extremities  She reports striking the left side of her forehead against the wall but denies loss of consciousness  Her 10year-old son helped her stand back up and she did not feel that her injuries were severe enough to come to the emergency department at that time  The patient denies chest pain, shortness of breath, nausea, vomiting, dysuria, hematuria, diarrhea or constipation  None       Past Medical History:   Diagnosis Date    Hypertension        History reviewed  No pertinent surgical history  History reviewed  No pertinent family history  I have reviewed and agree with the history as documented      E-Cigarette/Vaping    E-Cigarette Use Never User E-Cigarette/Vaping Substances     Social History     Tobacco Use    Smoking status: Never Smoker    Smokeless tobacco: Never Used   Vaping Use    Vaping Use: Never used   Substance Use Topics    Alcohol use: Not on file    Drug use: Not on file        Review of Systems   Constitutional: Negative for chills and fever  HENT: Negative for ear pain, postnasal drip, rhinorrhea and sore throat  Eyes: Negative for pain and visual disturbance  Respiratory: Negative for cough, shortness of breath and wheezing  Cardiovascular: Negative for chest pain and palpitations  Gastrointestinal: Positive for abdominal pain  Negative for constipation, diarrhea, nausea and vomiting  Endocrine: Negative  Genitourinary: Negative for dysuria, frequency, hematuria and urgency  Musculoskeletal: Positive for myalgias  Negative for arthralgias and back pain  Burning pain in the distal portion of all 4 extremities  Skin: Positive for color change and wound  Negative for rash  There is ecchymosis over the left frontal region secondary from fall she sustained yesterday  There is also some discoloration distal portion of her upper extremity digits  Allergic/Immunologic: Negative  Neurological: Positive for numbness  Negative for dizziness, seizures, syncope, light-headedness and headaches  Hematological: Negative  Psychiatric/Behavioral: Negative  All other systems reviewed and are negative        Physical Exam  ED Triage Vitals [09/17/21 1915]   Temperature Pulse Respirations Blood Pressure SpO2   99 9 °F (37 7 °C) (!) 111 18 138/81 98 %      Temp Source Heart Rate Source Patient Position - Orthostatic VS BP Location FiO2 (%)   Oral Monitor Sitting Left arm --      Pain Score       6             Orthostatic Vital Signs  Vitals:    09/18/21 0100 09/18/21 0130 09/18/21 0230 09/18/21 0400   BP: 116/74 106/61 106/67 106/61   Pulse: 98 98 88 82   Patient Position - Orthostatic VS: Lying Lying Lying        Physical Exam  Vitals and nursing note reviewed  Constitutional:       General: She is not in acute distress  Appearance: Normal appearance  She is well-developed and normal weight  She is not ill-appearing  HENT:      Head: Normocephalic and atraumatic  Nose: Nose normal  No congestion or rhinorrhea  Mouth/Throat:      Mouth: Mucous membranes are moist       Pharynx: Oropharynx is clear  No oropharyngeal exudate or posterior oropharyngeal erythema  Eyes:      Extraocular Movements: Extraocular movements intact  Conjunctiva/sclera: Conjunctivae normal       Pupils: Pupils are equal, round, and reactive to light  Cardiovascular:      Rate and Rhythm: Regular rhythm  Tachycardia present  Pulses: Normal pulses  Heart sounds: Normal heart sounds  No murmur heard  No friction rub  Pulmonary:      Effort: Pulmonary effort is normal  No respiratory distress  Breath sounds: Normal breath sounds  Abdominal:      General: Abdomen is flat  Bowel sounds are normal  There is no distension  Palpations: Abdomen is soft  There is no mass  Tenderness: There is abdominal tenderness  There is no guarding or rebound  Musculoskeletal:         General: Swelling present  No tenderness  Normal range of motion  Cervical back: Normal range of motion and neck supple  No rigidity or tenderness  Comments: Bilateral nonpitting edema her ankles  Skin:     General: Skin is warm and dry  Capillary Refill: Capillary refill takes 2 to 3 seconds  Findings: Bruising present  Comments: Ecchymosis of the left frontal region her head secondary to a fall sustained yesterday  There is also a small bruising nodule on her right palm and some nonspecific discoloration of her distal digits in her upper extremities  Neurological:      General: No focal deficit present  Mental Status: She is alert and oriented to person, place, and time   Mental status is at baseline  Cranial Nerves: No cranial nerve deficit  Sensory: No sensory deficit  Motor: No weakness  Psychiatric:         Mood and Affect: Mood normal          Behavior: Behavior normal          Thought Content: Thought content normal          Judgment: Judgment normal          ED Medications  Medications   sodium chloride 0 9 % infusion (125 mL/hr Intravenous New Bag 9/18/21 0512)   ceftriaxone (ROCEPHIN) 1 g/50 mL in dextrose IVPB (1,000 mg Intravenous New Bag 9/18/21 0512)   doxycycline (VIBRAMYCIN) 100 mg in sodium chloride 0 9 % 100 mL IVPB (has no administration in time range)   HYDROmorphone (DILAUDID) injection 0 5 mg (0 5 mg Intravenous Given 9/18/21 0112)   sodium chloride 0 9 % bolus 1,000 mL (0 mL Intravenous Stopped 9/18/21 0216)   iohexol (OMNIPAQUE) 350 MG/ML injection (SINGLE-DOSE) 100 mL (100 mL Intravenous Given 9/18/21 0157)       Diagnostic Studies  Results Reviewed     Procedure Component Value Units Date/Time    Troponin I [[id number]]  (Normal) Collected: 09/18/21 0028    Lab Status: Final result Specimen: Blood from Arm, Left Updated: 09/18/21 0100     Troponin I <0 02 ng/mL     CKMB [[id number]]  (Normal) Collected: 09/17/21 2236    Lab Status: Final result Specimen: Blood from Arm, Left Updated: 09/18/21 0016     CK-MB Index 1 9 %      CK-MB 3 8 ng/mL     Lactic acid [[id number]]  (Normal) Collected: 09/17/21 2325    Lab Status: Final result Specimen: Blood from Arm, Left Updated: 09/18/21 0009     LACTIC ACID 0 9 mmol/L     Narrative:      Result may be elevated if tourniquet was used during collection      Urine Microscopic [[id number]]  (Normal) Collected: 09/17/21 2234    Lab Status: Final result Specimen: Urine, Clean Catch Updated: 09/17/21 2357     RBC, UA None Seen /hpf      WBC, UA None Seen /hpf      Epithelial Cells Occasional /hpf      Bacteria, UA None Seen /hpf     Novel Coronavirus (Covid-19),PCR [facility] - 2 Hour Stat [[id number]]  (Normal) Collected: 09/17/21 2236 Lab Status: Final result Specimen: Nares from Nose Updated: 09/17/21 2357     SARS-CoV-2 Negative    Narrative: The specimen collection materials, transport medium, and/or testing methodology utilized in the production of these test results have been proven to be reliable in a limited validation with an abbreviated program under the Emergency Utilization Authorization provided by the FDA  Testing reported as "Presumptive positive" will be confirmed with secondary testing to ensure result accuracy  Clinical caution and judgement should be used with the interpretation of these results with consideration of the clinical impression and other laboratory testing  Testing reported as "Positive" or "Negative" has been proven to be accurate according to standard laboratory validation requirements  All testing is performed with control materials showing appropriate reactivity at standard intervals        Comprehensive metabolic panel [[id number]]  (Abnormal) Collected: 09/17/21 2236    Lab Status: Final result Specimen: Blood from Arm, Left Updated: 09/17/21 2337     Sodium 139 mmol/L      Potassium 4 1 mmol/L      Chloride 106 mmol/L      CO2 27 mmol/L      ANION GAP 6 mmol/L      BUN 11 mg/dL      Creatinine 0 77 mg/dL      Glucose 88 mg/dL      Calcium 8 8 mg/dL      Corrected Calcium 10 1 mg/dL       U/L      ALT 79 U/L      Alkaline Phosphatase 187 U/L      Total Protein 8 7 g/dL      Albumin 2 4 g/dL      Total Bilirubin 0 37 mg/dL      eGFR 106 ml/min/1 73sq m     Narrative:      [place] guidelines for Chronic Kidney Disease (CKD):     Stage 1 with normal or high GFR (GFR > 90 mL/min/1 73 square meters)    Stage 2 Mild CKD (GFR = 60-89 mL/min/1 73 square meters)    Stage 3A Moderate CKD (GFR = 45-59 mL/min/1 73 square meters)    Stage 3B Moderate CKD (GFR = 30-44 mL/min/1 73 square meters)    Stage 4 Severe CKD (GFR = 15-29 mL/min/1 73 square meters)    Stage 5 End Stage CKD (GFR <15 mL/min/1 73 square meters)  Note: GFR calculation is accurate only with a steady state creatinine    CBC and differential [[id number]]  (Abnormal) Collected: 09/17/21 2236    Lab Status: Final result Specimen: Blood from Arm, Left Updated: 09/17/21 2335     WBC 2 63 Thousand/uL      RBC 3 10 Million/uL      Hemoglobin 9 4 g/dL      Hematocrit 28 8 %      MCV 93 fL      MCH 30 3 pg      MCHC 32 6 g/dL      RDW 25 6 %      Platelets 79 Thousands/uL     Manual Differential(PHLEBS Do Not Order) [[id number]]  (Abnormal) Collected: 09/17/21 2236    Lab Status: Final result Specimen: Blood from Arm, Left Updated: 09/17/21 2335     Segmented % 62 %      Lymphocytes % 30 %      Monocytes % 5 %      Eosinophils, % 0 %      Basophils % 0 %      Atypical Lymphocytes % 3 %      Absolute Neutrophils 1 63 Thousand/uL      Lymphocytes Absolute 0 79 Thousand/uL      Monocytes Absolute 0 13 Thousand/uL      Eosinophils Absolute 0 00 Thousand/uL      Basophils Absolute 0 00 Thousand/uL      Total Counted --     RBC Morphology Present     Anisocytosis Present     Rouleaux Present     Target Cells Present     Tear Drop Cells Present     Platelet Estimate Decreased    Blood culture #1 [[id number]] Collected: 09/17/21 2325    Lab Status: In process Specimen: Blood from Arm, Left Updated: 09/17/21 2335    CK Total with Reflex CKMB [[id number]]  (Abnormal) Collected: 09/17/21 2236    Lab Status: Final result Specimen: Blood from Arm, Left Updated: 09/17/21 2332     Total  U/L     Lipase [[id number]]  (Abnormal) Collected: 09/17/21 2236    Lab Status: Final result Specimen: Blood from Arm, Left Updated: 09/17/21 2332     Lipase 431 u/L     D-Dimer [[id number]]  (Abnormal) Collected: 09/17/21 2236    Lab Status: Final result Specimen: Blood from Arm, Left Updated: 09/17/21 2326     D-Dimer, Quant 3 73 ug/ml FEU     Blood culture #2 [[id number]] Collected: 09/17/21 2236    Lab Status:  In process Specimen: Blood from Arm, Left Updated: 09/17/21 2247    POCT pregnancy, urine [[id number]]  (Normal) Resulted: 09/17/21 2240    Lab Status: Final result Updated: 09/17/21 2240     EXT PREG TEST UR (Ref: Negative) Negative     Control Valid    Urine Macroscopic, POC [[id number]]  (Abnormal) Collected: 09/17/21 2234    Lab Status: Final result Specimen: Urine Updated: 09/17/21 2236     Color, UA Yellow     Clarity, UA Clear     pH, UA 6 5     Leukocytes, UA Negative     Nitrite, UA Negative     Protein, UA Negative mg/dl      Glucose, UA Negative mg/dl      Ketones, UA Negative mg/dl      Urobilinogen, UA 0 2 E U /dl      Bilirubin, UA Negative     Blood, UA Trace     Specific McIntosh, UA 1 020    Narrative:      CLINITEK RESULT                 PE Study with CT Abdomen and Pelvis with contrast   ED Interpretation by Taurus Mckay DO (09/18 0350)   Adenopathy is seen throughout the visualized anatomy including in the bilateral axilla, bilateral divya, mediastinum, retroperitoneum, and bilateral iliac chain of unknown origin  Recommend clinical correlation      3 4 x 2 5 cm nodule in the right thyroid lobe  Incidental discovery of one or more thyroid nodule(s) measuring more than 1 5 cm and without suspicious features is noted in this patient who is above 28years old; according to guidelines published in the   February 2015 white paper on incidental thyroid nodules in the Journal of the [employer] Vickie Lewis), further characterization with thyroid ultrasound is recommended      Bilateral upper lobe infiltrates larger on the right          No pulmonary embolus is seen  No aortic aneurysm or dissection      Final Result by Quan Cesar MD (09/18 0342)      Adenopathy is seen throughout the visualized anatomy including in the bilateral axilla, bilateral divya, mediastinum, retroperitoneum, and bilateral iliac chain of unknown origin  Recommend clinical correlation  3 4 x 2 5 cm nodule in the right thyroid lobe    Incidental discovery of one or more thyroid nodule(s) measuring more than 1 5 cm and without suspicious features is noted in this patient who is above 28years old; according to guidelines published in the    February 2015 white paper on incidental thyroid nodules in the Journal of the [employer] Robina Randolph), further characterization with thyroid ultrasound is recommended  Bilateral upper lobe infiltrates larger on the right           No pulmonary embolus is seen  No aortic aneurysm or dissection             I personally discussed this study with Christin Enamorado on 9/18/2021 at 3:42 AM                Workstation performed: [id number]         XR chest 1 view portable   ED Interpretation by Claudine Baig DO (09/18 0113)   Trachea midline, no bony abnormalities, cardiac silhouette is appropriately sized, costophrenic angles are well visualized as well as lung markings  Appears to be normal chest x-ray  Procedures  ECG 12 Lead Documentation Only    Date/Time: 9/17/2021 10:49 PM  Performed by: Claudine Baig DO  Authorized by: Claudine Baig DO     Indications / Diagnosis:  Swelling  ECG reviewed by me, the ED Provider: yes    Patient location:  ED  Previous ECG:     Previous ECG:  Unavailable    Comparison to cardiac monitor: Yes    Interpretation:     Interpretation: non-specific    Rate:     ECG rate:  98    ECG rate assessment: normal    Rhythm:     Rhythm: sinus rhythm    QRS:     QRS axis:  Normal    QRS intervals:  Normal  Conduction:     Conduction: normal    ST segments:     ST segments:  Normal  T waves:     T waves: flattening      Flattening:  III  Comments:      Nonspecific T-wave flattening in lead III  Otherwise normal ECG    ECG 12 Lead Documentation Only    Date/Time: 9/17/2021 11:54 PM  Performed by: Claudine Baig DO  Authorized by: Claudine Baig DO     Indications / Diagnosis:  Chest pain  ECG reviewed by me, the ED Provider: yes    Patient location:  ED  Previous ECG:     Previous ECG:  Compared to current    Comparison ECG info:  No T wave abnormalities  Comparison to cardiac monitor: Yes    Interpretation:     Interpretation: normal    Rate:     ECG rate:  91    ECG rate assessment: normal    Rhythm:     Rhythm: sinus rhythm    Ectopy:     Ectopy: none    QRS:     QRS axis:  Normal    QRS intervals:  Normal  Conduction:     Conduction: normal    ST segments:     ST segments:  Normal  T waves:     T waves: normal    Comments:      Normal ECG  ED Course  ED Course as of Sep 18 0523   Sat Sep 18, 2021   0305 PE Study with CT Abdomen and Pelvis with contrast                             SBIRT 20yo+      Most Recent Value   SBIRT (23 yo +)   In order to provide better care to our patients, we are screening all of our patients for alcohol and drug use  Would it be okay to ask you these screening questions? Yes Filed at: 09/17/2021 2324   Initial Alcohol Screen: US AUDIT-C    1  How often do you have a drink containing alcohol?  0 Filed at: 09/17/2021 2324   2  How many drinks containing alcohol do you have on a typical day you are drinking? 0 Filed at: 09/17/2021 2324   3b  FEMALE Any Age, or MALE 65+: How often do you have 4 or more drinks on one occassion? 0 Filed at: 09/17/2021 2324   Audit-C Score  0 Filed at: 09/17/2021 2324   LARRY: How many times in the past year have you    Used an illegal drug or used a prescription medication for non-medical reasons?   Never Filed at: 09/17/2021 2324          Wells' Criteria for PE      Most Recent Value   Wells' Criteria for PE   Clinical signs and symptoms of DVT  0 Filed at: 09/17/2021 2239   PE is primary diagnosis or equally likely  0 Filed at: 09/17/2021 2239   HR >100  1 5 Filed at: 09/17/2021 2239   Immobilization at least 3 days or Surgery in the previous 4 weeks  0 Filed at: 09/17/2021 2239   Previous, objectively diagnosed PE or DVT  0 Filed at: 09/17/2021 2239   Hemoptysis  0 Filed at: 09/17/2021 2239   Malignancy with treatment within 6 months or palliative  0 Filed at: 09/17/2021 2239   Wells' Criteria Total  1 5 Filed at: 09/17/2021 2239            MDM  Number of Diagnoses or Management Options  Bilateral pulmonary infiltrates on chest x-ray  Diffuse lymphadenopathy  Pneumonia  Diagnosis management comments: Patient is a 49-year-old female with no significant past medical history who reports the emergency department with complaint of extremity pain and abdominal pain  Upon initial presentation the patient was A&O x4 in no acute distress lying in her room bed  Cranial nerves 2-12 are grossly intact with no focal or sensational deficits noted and she had 5/5 strength in all 4 extremities  Her heart had a regularly regular rate and rhythm with no murmurs or rubs appreciated and her lungs had mild crackles in the bilateral lung bases with normal effort and no wheezing  There was a small bruise on her left forehead in the frontal region but there was no edema  She had +2 pulses and 2nd capillary refill in all 4 extremities  There was a small nodule on her right palm and mild discoloration of her digits of the upper extremities  There was also mild nonpitting edema in her ankles bilaterally  Due to the patient's recent history of travel and a positive PERC have ordered a D-dimer  Her Wells score was 1 5  I have also ordered an ECG and chest x-ray but hold on a troponin due to the clinical presentation without chest pain or shortness of breath  Patient reports that her last menstrual period was in July so I have ordered a pregnancy screen and will assess for infection with UA, blood cultures, CBC, CMP as well as other studies  Results are pending at this time  23:27  patient's D-dimer was elevated to 3 26 and I have ordered a PE study with abdomen and pelvis with contrast at this time    I spoke with the nurse reports that her IV access infiltrated and was causing her pain so they have not given her the Dilaudid and we will have to gain IV access again before she can receive her PE study  00:07  patient is now complaining of chest pain substernally that is nonradiating she ranks it a 6/10  I have ordered a troponin at this time  Patient also meets sepsis criteria with a heart rate of 103 and a WBC of 2 6  I have ordered 1 L fluid bolus and 125 mL drip once IV access is established  I am going to attempt ultrasound-guided access at this time  00:15  I attempted ultrasound-guided IV and gained momentary access  I was able to draw troponin however shortly after the IV infiltrated and we were forced to pull it  We will attempt again momentarily  Patient lactic is 0 9     00:54  IV access was gained with the help of Dr Ismael Remy  04:24  bilateral infiltrates were seen on CT of the chest and her upper lobes  I will start Rocephin and doxy at this time and consult and patient slim for admission  CT also noted diffuse adenopathy throughout in addition to thyroid nodules which were discussed with the patient  04:52  Spoke with Dr Axel Guerin who agreed to admit patient to inpatient [facility] under the care of Dr Tahira Brennan  Disposition  Final diagnoses:   Bilateral pulmonary infiltrates on chest x-ray   Diffuse lymphadenopathy   Pneumonia     Time reflects when diagnosis was documented in both MDM as applicable and the Disposition within this note     Time User Action Codes Description Comment    9/18/2021  5:09 AM Nydia Burns Add [R91 8] Bilateral pulmonary infiltrates on chest x-ray     9/18/2021  5:10 AM Salazar Roberts Add [R59 1] Diffuse lymphadenopathy     9/18/2021  5:10 AM Nydia Burns Add [J18 9] Pneumonia       ED Disposition     ED Disposition Condition Date/Time Comment    Admit Stable Sat Sep 18, 2021  5:09 AM Case was discussed with Dr Axel Guerin and the patient's admission status was agreed to be inpatient [facility] to the service of Dr Tahira Brennan           Follow-up Information None         Patient's Medications    No medications on file     No discharge procedures on file  PDMP Review       Value Time User    PDMP Reviewed  Yes 9/17/2021 10:08 PM Caty Weaver MD           ED Provider  Attending physically available and evaluated Sandy Sutherland  STEPHANIE managed the patient along with the ED Attending      Electronically Signed by         Florida Longoria DO  09/18/21 7281

## 2021-09-18 NOTE — ASSESSMENT & PLAN NOTE
3 4 x 2 5 cm nodule in the right thyroid lobe  Incidental discovery of one or more thyroid nodule(s) measuring more than 1 5 cm and without suspicious features is noted in this patient who is above 28years old; according to guidelines published in the   February 2015 white paper on incidental thyroid nodules in the Journal of the [employer] [facility]), further characterization with thyroid ultrasound is recommended      Plan:  - f/u TSH and free T4  - thyroid u/s outpatient

## 2021-09-19 ENCOUNTER — APPOINTMENT (INPATIENT)
Dept: ULTRASOUND IMAGING | Facility: HOSPITAL | Age: 46
DRG: 988 | End: 2021-09-19

## 2021-09-19 ENCOUNTER — APPOINTMENT (INPATIENT)
Dept: CT IMAGING | Facility: HOSPITAL | Age: 46
DRG: 988 | End: 2021-09-19

## 2021-09-19 LAB
ALBUMIN SERPL BCP-MCNC: 2.2 G/DL (ref 3.5–5)
ALP SERPL-CCNC: 186 U/L (ref 46–116)
ALT SERPL W P-5'-P-CCNC: 80 U/L (ref 12–78)
ANION GAP SERPL CALCULATED.3IONS-SCNC: 5 MMOL/L (ref 4–13)
AST SERPL W P-5'-P-CCNC: 250 U/L (ref 5–45)
ATRIAL RATE: 102 BPM
ATRIAL RATE: 95 BPM
BASOPHILS # BLD MANUAL: 0 THOUSAND/UL (ref 0–0.1)
BASOPHILS NFR MAR MANUAL: 0 % (ref 0–1)
BILIRUB SERPL-MCNC: 0.56 MG/DL (ref 0.2–1)
BUN SERPL-MCNC: 8 MG/DL (ref 5–25)
CALCIUM ALBUM COR SERPL-MCNC: 9.5 MG/DL (ref 8.3–10.1)
CALCIUM SERPL-MCNC: 8.1 MG/DL (ref 8.3–10.1)
CHLORIDE SERPL-SCNC: 108 MMOL/L (ref 100–108)
CO2 SERPL-SCNC: 26 MMOL/L (ref 21–32)
CREAT SERPL-MCNC: 0.73 MG/DL (ref 0.6–1.3)
EOSINOPHIL # BLD MANUAL: 0 THOUSAND/UL (ref 0–0.4)
EOSINOPHIL NFR BLD MANUAL: 0 % (ref 0–6)
ERYTHROCYTE [DISTWIDTH] IN BLOOD BY AUTOMATED COUNT: 25.5 % (ref 11.6–15.1)
FERRITIN SERPL-MCNC: 380 NG/ML (ref 8–388)
GFR SERPL CREATININE-BSD FRML MDRD: 113 ML/MIN/1.73SQ M
GLUCOSE SERPL-MCNC: 77 MG/DL (ref 65–140)
HCT VFR BLD AUTO: 29.3 % (ref 34.8–46.1)
HGB BLD-MCNC: 9.4 G/DL (ref 11.5–15.4)
HYPERCHROMIA BLD QL SMEAR: PRESENT
IRON SATN MFR SERPL: 36 % (ref 15–50)
IRON SERPL-MCNC: 56 UG/DL (ref 50–170)
LYMPHOCYTES # BLD AUTO: 0.61 THOUSAND/UL (ref 0.6–4.47)
LYMPHOCYTES # BLD AUTO: 22 % (ref 14–44)
MCH RBC QN AUTO: 30.1 PG (ref 26.8–34.3)
MCHC RBC AUTO-ENTMCNC: 32.1 G/DL (ref 31.4–37.4)
MCV RBC AUTO: 94 FL (ref 82–98)
MONOCYTES # BLD AUTO: 0.06 THOUSAND/UL (ref 0–1.22)
MONOCYTES NFR BLD: 2 % (ref 4–12)
NEUTROPHILS # BLD MANUAL: 2.11 THOUSAND/UL (ref 1.85–7.62)
NEUTS BAND NFR BLD MANUAL: 2 % (ref 0–8)
NEUTS SEG NFR BLD AUTO: 74 % (ref 43–75)
P AXIS: 42 DEGREES
P AXIS: 42 DEGREES
PLATELET # BLD AUTO: 74 THOUSANDS/UL (ref 149–390)
PLATELET BLD QL SMEAR: ABNORMAL
POTASSIUM SERPL-SCNC: 3.8 MMOL/L (ref 3.5–5.3)
PR INTERVAL: 158 MS
PR INTERVAL: 166 MS
PROCALCITONIN SERPL-MCNC: 0.12 NG/ML
PROCALCITONIN SERPL-MCNC: 0.17 NG/ML
PROT SERPL-MCNC: 7.8 G/DL (ref 6.4–8.2)
QRS AXIS: 56 DEGREES
QRS AXIS: 59 DEGREES
QRSD INTERVAL: 70 MS
QRSD INTERVAL: 72 MS
QT INTERVAL: 316 MS
QT INTERVAL: 328 MS
QTC INTERVAL: 404 MS
QTC INTERVAL: 404 MS
RBC # BLD AUTO: 3.12 MILLION/UL (ref 3.81–5.12)
RETICS # AUTO: ABNORMAL 10*3/UL (ref 14097–95744)
RETICS # CALC: 2.61 % (ref 0.37–1.87)
ROULEAUX BLD QL SMEAR: PRESENT
RPR SER QL: REACTIVE
RPR SER-TITR: ABNORMAL {TITER}
SODIUM SERPL-SCNC: 139 MMOL/L (ref 136–145)
T WAVE AXIS: 25 DEGREES
T WAVE AXIS: 31 DEGREES
T4 FREE SERPL-MCNC: 1.06 NG/DL (ref 0.76–1.46)
TARGETS BLD QL SMEAR: PRESENT
TIBC SERPL-MCNC: 156 UG/DL (ref 250–450)
VENTRICULAR RATE: 91 BPM
VENTRICULAR RATE: 98 BPM
VIT B12 SERPL-MCNC: 886 PG/ML (ref 100–900)
WBC # BLD AUTO: 2.77 THOUSAND/UL (ref 4.31–10.16)

## 2021-09-19 PROCEDURE — 83540 ASSAY OF IRON: CPT | Performed by: INTERNAL MEDICINE

## 2021-09-19 PROCEDURE — 76536 US EXAM OF HEAD AND NECK: CPT

## 2021-09-19 PROCEDURE — 85027 COMPLETE CBC AUTOMATED: CPT | Performed by: INTERNAL MEDICINE

## 2021-09-19 PROCEDURE — 80053 COMPREHEN METABOLIC PANEL: CPT | Performed by: INTERNAL MEDICINE

## 2021-09-19 PROCEDURE — 70450 CT HEAD/BRAIN W/O DYE: CPT

## 2021-09-19 PROCEDURE — 36415 COLL VENOUS BLD VENIPUNCTURE: CPT | Performed by: INTERNAL MEDICINE

## 2021-09-19 PROCEDURE — 85045 AUTOMATED RETICULOCYTE COUNT: CPT | Performed by: INTERNAL MEDICINE

## 2021-09-19 PROCEDURE — 82728 ASSAY OF FERRITIN: CPT | Performed by: INTERNAL MEDICINE

## 2021-09-19 PROCEDURE — 93010 ELECTROCARDIOGRAM REPORT: CPT | Performed by: INTERNAL MEDICINE

## 2021-09-19 PROCEDURE — 85007 BL SMEAR W/DIFF WBC COUNT: CPT | Performed by: INTERNAL MEDICINE

## 2021-09-19 PROCEDURE — 99222 1ST HOSP IP/OBS MODERATE 55: CPT | Performed by: INTERNAL MEDICINE

## 2021-09-19 PROCEDURE — 84145 PROCALCITONIN (PCT): CPT

## 2021-09-19 PROCEDURE — 83550 IRON BINDING TEST: CPT | Performed by: INTERNAL MEDICINE

## 2021-09-19 PROCEDURE — G1004 CDSM NDSC: HCPCS

## 2021-09-19 PROCEDURE — 97163 PT EVAL HIGH COMPLEX 45 MIN: CPT

## 2021-09-19 PROCEDURE — 82607 VITAMIN B-12: CPT | Performed by: INTERNAL MEDICINE

## 2021-09-19 PROCEDURE — 99232 SBSQ HOSP IP/OBS MODERATE 35: CPT | Performed by: INTERNAL MEDICINE

## 2021-09-19 PROCEDURE — 99446 NTRPROF PH1/NTRNET/EHR 5-10: CPT | Performed by: RADIOLOGY

## 2021-09-19 RX ADMIN — ENOXAPARIN SODIUM 40 MG: 40 INJECTION SUBCUTANEOUS at 09:19

## 2021-09-19 RX ADMIN — GABAPENTIN 100 MG: 100 CAPSULE ORAL at 09:19

## 2021-09-19 RX ADMIN — ACETAMINOPHEN 650 MG: 325 TABLET, FILM COATED ORAL at 16:08

## 2021-09-19 RX ADMIN — OXYCODONE HYDROCHLORIDE 2.5 MG: 5 TABLET ORAL at 06:08

## 2021-09-19 RX ADMIN — GABAPENTIN 100 MG: 100 CAPSULE ORAL at 16:08

## 2021-09-19 RX ADMIN — SODIUM CHLORIDE 75 ML/HR: 0.9 INJECTION, SOLUTION INTRAVENOUS at 17:09

## 2021-09-19 RX ADMIN — GABAPENTIN 100 MG: 100 CAPSULE ORAL at 20:37

## 2021-09-19 RX ADMIN — OXYCODONE HYDROCHLORIDE 2.5 MG: 5 TABLET ORAL at 12:26

## 2021-09-19 NOTE — ASSESSMENT & PLAN NOTE
Patient endorses 20 lb weight loss over last year  Also endorses night sweats, forgetfulness, word finding difficulty, dry skin significant hair loss, arthralgias of the bilateral hands, wrists, toes, and left knee  No joint swelling on exam though significant tenderness of MCPs, wrists  Additionally, patient developed generalized weakness so severe that she sustained a fall with headstrike of the left temporal region, no loss of consciousness  Cough and congestion for last few days  Last year was put on Methotrexate and Prednisone as her doctor presumed RA, as a diagnosis of exclusion, patient didn't tolerate medications, only took for 5 months   Is fully vaccinated against COVID  Unsure of her childhood vaccine status  At this point, the etiology of the patient's symptoms is unclear  Patient is pancytopenic, Mild transaminitis,   CTA chest/abdomen/pelvis with no evidence of PE, though adenopathy is seen throughout the visualized anatomy including in the bilateral axilla, bilateral hilar, mediastinum, retroperitoneum, and bilateral iliac chain of unknown origin  CTH negative for intracranial pathology    Hepatitis panel nonreactive  HIV non reactive  Covid negative  retic %:2 61  Blood cx: NGTD  CRP and Sed rate both elevated  Positive D-dimer 3 73, CTA negative for PE  Serum protein 7 8 with low serum albumin of 2 2  Differential includes infectious etiology such as parvovirus B19, tuberculosis, EBV, CMV; autoimmune disease including but not limited to SLE, sarcoidosis, rheumatoid arthritis; and strong suspicion for possible hematologic malignancy given presence of B symptoms and diffuse lymphadenopathy  Patient with recent flight, decompression disease (Caison disease) could be contributing to arthralgias and abdominal pain       Plan:  - IVF with NS @ 75 cc/hr  - Infectious workup: EBV, CMP, RPR, quantiferon gold  - Autoimmune workup: CRP, ESR, PRATEEK, rheumatoid factor, anti CCP  - Neoplasm workup: SPEP, UPEP  -b12  -possible thyroid ultrasound  - infectious disease for further evaluation   -Heme/Onc to proceed with lymph node biopsy

## 2021-09-19 NOTE — ASSESSMENT & PLAN NOTE
Patient reports unintentional weight loss of approximately 20 lbs over the past year  Began seeing an herbal specialist in her home [place] of [facility] for these symptoms and was given an herbal supplement that she started taking in March of 2021  Unsure what is in the herbal supplement, but has not taken it over a few weeks  Endorses poor appetite prior to arrival in the [address] on Monday  Per her cousin at bedside, she has had a good appetite since arriving here  Herbalist also recommended the patient adhere to a vegetarian and gluten free diet, which the patient has been compliant with  States her weight loss began prior to making dietary changes       Plan:  -LN biopsy results pending on discharge  -Thyroid u/s results were benign, no need for follow up on discharge

## 2021-09-19 NOTE — ASSESSMENT & PLAN NOTE
Patient endorses 20 lb weight loss over last year  Also endorses night sweats, forgetfulness, word finding difficulty, dry skin significant hair loss, arthralgias of the bilateral hands, wrists, toes, and left knee  No joint swelling on exam though significant tenderness of MCPs, wrists  Additionally, patient developed generalized weakness so severe that she sustained a fall with headstrike of the left temporal region, no loss of consciousness  Cough and congestion for last few days  Last year was put on Methotrexate and Prednisone as her doctor presumed RA, as a diagnosis of exclusion, patient didn't tolerate medications, only took for 5 months   Is fully vaccinated against COVID  Unsure of her childhood vaccine status  At this point, the etiology of the patient's symptoms is unclear  Patient is pancytopenic, Mild transaminitis,   CTA chest/abdomen/pelvis with no evidence of PE, though adenopathy is seen throughout the visualized anatomy including in the bilateral axilla, bilateral hilar, mediastinum, retroperitoneum, and bilateral iliac chain of unknown origin  CTH negative for intracranial pathology    Hepatitis panel nonreactive  HIV non reactive  Covid negative  retic %:2 61  Blood cx: NGTD  CRP and Sed rate both elevated  Positive D-dimer 3 73, CTA negative for PE  PRATEEK positive  SPEP: showed faint possible band in gamma region, however immunofixation showed no monoclonal gammopathy  UPEP:  No monoclonal bands noted  Rheumatoid factor positive  Thyroid nodule ultrasound: no need for biopsy or follow up  RPR reactive, FTA abs still pending    Differential includes infectious etiology such as parvovirus B19, tuberculosis, EBV, CMV; autoimmune disease including but not limited to SLE, sarcoidosis, rheumatoid arthritis; and strong suspicion for possible hematologic malignancy given presence of B symptoms and diffuse lymphadenopathy   Patient with recent flight, decompression disease (Caison disease) could be contributing to arthralgias and abdominal pain       Plan:  -received IV hydration while inpatient    STILL PENDING:  - Infectious workup: EBV, CMV, quantiferon gold, FTA antibodies pending  - Autoimmune workup: CRP, ESR, anti CCP  -immunoglobulin free LT chains  -b12  -results of lymph node biopsy  -Leukemia/lymphoma flow cytometry

## 2021-09-19 NOTE — ASSESSMENT & PLAN NOTE
CTA chest/abd/pelvis: Bilateral upper lobe infiltrates larger on the right  Unclear if this is infectious in nature  Patient endorses mild cough and congestion for the past few days  Patient did receive ceftriaxone and doxycycline in the ED       Plan:  - hold antibiotics for now   - monitor for fever  - f/u quant gold   - lymph node biopsy

## 2021-09-19 NOTE — PHYSICAL THERAPY NOTE
PHYSICAL THERAPY EVALUATION NOTE    Patient Name: Bertha Howard  DFAZM'O Date: 2021     AGE:   48 y o  Mrn:   [id number]  ADMIT DX:  No admission diagnoses are documented for this encounter  Past Medical History:   Diagnosis Date    Hypertension      Length Of Stay: 1  PHYSICAL THERAPY EVALUATION :   Patient's identity confirmed via 2 patient identifiers (full name and ) at start of session       21 1352   PT Last Visit   PT Visit Date 21   Note Type   Note type Evaluation   Pain Assessment   Pain Location/Orientation Location: Generalized   Home Living   Type of [address];Stairs to enter with rails  (0 SITA)   Additional Comments Pt reports she is visiting and staying with her cousin    Prior Function   Level of [place] Independent with ADLs and functional mobility   Lives With Family  (visiting cousin, dtr)   [address] in the last 6 months 1 to 4   Vocational Full time employment  ()   Comments Pt reports fully independent PTA, works full time  Recent fall included (+) head strike on L side  She reports some memory issues prior to head strike   Restrictions/Precautions   Weight Bearing Precautions Per Order No   Other Precautions Multiple lines; Fall Risk;Pain   General   Family/Caregiver Present No   Cognition   Overall Cognitive Status WFL   Arousal/Participation Alert   Orientation Level Oriented X4   Memory Decreased short term memory   Following Commands Follows multistep commands with increased time or repetition   Comments Pt very pleasant and agreeable to participate in PT evaluation  Pt and RN report she's been ambulatory to/from bathroom w/ S from staff   Pt c/o feeling hot and feverish and overall still "not well"   Strength RLE   R Hip Flexion 4-/5   R Knee Extension 4-/5   R Ankle Dorsiflexion 4-/5   Strength LLE   L Hip Flexion 4-/5   L Knee Extension 4-/5   L Ankle Dorsiflexion 4-/5   Coordination   Sensation X   Light Touch   RLE Light Touch Grossly intact   LLE Light Touch Impaired   LLE Light Touch Comments N+T of L great toe worse then R; pt only reports N+T in her toes and her fingers, not along the feet or hands   Bed Mobility   Supine to Sit 6  Modified independent   Additional items Increased time required   Transfers   Sit to Stand 5  Supervision   Additional items Increased time required   Stand to Sit 5  Supervision   Additional items Increased time required   Additional Comments Pt performed STS x 2 w/ S each time   Ambulation/Elevation   Gait pattern Decreased foot clearance; Short stride  (cautious)   Gait Assistance 5  Supervision   Additional items Assist x 1   Assistive Device None   Distance 25 ft   Curbs Pt c/o increased pain in feet and feverish feeling, deferred further ambulation at this time  Notified DIOGENES England of pt's request for something for feeling "feverish"   Balance   Static Sitting Good   Static Standing Brando Madrigal 0802 -   Activity Tolerance   Activity Tolerance Patient limited by pain   Nurse Made Aware Spoke to DIOGENES England   Assessment   Prognosis Good   Problem List Decreased strength;Decreased endurance; Impaired balance;Decreased mobility; Impaired sensation;Pain   Assessment Riccardo Robb is a 48 y o  Female who [facility]2021 from home w/ multiple c/o including recent fall, B hand and foot weakness and N+T, unexplained weight loss, night sweats and diagnosis of generalized weakness  Extensive DDx w/ workup still being completed  Orders for PT eval and treat received, w/ activity orders of up and OOB as tolerated  Pt presents w/ comorbidities of pulmonary infiltrates, also apparently recent steroid use  Limited hx due to pt visiting the [address]  Personal factors including: positive fall history   At baseline, pt mobilizes independently, and reports 1 falls in the last 6 months  Upon evaluation, pt presents w/ the following deficits: weakness, altered sensation, impaired balance, decreased endurance and pain limiting functional mobility  Pt requires mod I for bed mobility, S for transfers, and S for gait  Pt's clinical presentation is unstable/unpredictable due to need for assist w/ all phases of mobility when usually mobilizing independently, tolerance to only 25 feet of ambulation, pain impacting overall mobility status and recent history of falls  Pt is at an increased risk of falls due to physical deficits  Given the above findings, discharge recommendation is for Home w/ family support   During this admission, pt would benefit from skilled acute inpatient PT in order to address the abovementioned deficits to maximize function and mobility before DC from acute care  Goals   Patient Goals feel better   STG Expiration Date 09/29/21   Short Term Goal #1 Patient will: Increase bilateral LE strength 1/2 grade to facilitate independent mobility, Perform all bed mobility tasks independently to decrease fall risk factors, Perform all transfers independently to improve independence, Ambulate at least 200 ft  +/- LRAD modified independent w/o LOB, Navigate 1 full flight of stairs w/ supervision with unilateral handrail to facilitate return to previous living environment, Increase all balance 1/2 grade to decrease risk for falls, Complete exercise program independently and Tolerate 3 hr OOB to faciliate upright tolerance   PT Treatment Day 0   Plan   Treatment/Interventions Functional transfer training;LE strengthening/ROM; Elevations; Therapeutic exercise; Endurance training;Patient/family training;Equipment eval/education; Bed mobility;Gait training   PT Frequency Other (Comment)  (3-5x/wk)   Recommendation   PT Discharge Recommendation No rehabilitation needs  (potentially OPPT pending mobility)   Equipment Recommended [address] [facility] Recommended Wheeled walker Change/add to Planbox? No   AM-PAC Basic Mobility Inpatient   Turning in Bed Without Bedrails 4   Lying on Back to Sitting on Edge of Flat Bed 4   Moving Bed to Chair 3   Standing Up From Chair 3   Walk in Room 3   Climb 3-5 Stairs 3   Basic Mobility Inpatient Raw Score 20   Basic Mobility Standardized Score 43 99       The patient's AM-PAC Basic Mobility Inpatient Short Form Raw Score is 20, Standardized Score is 43 99  A standardized score greater than 42 9 suggests the patient may benefit from discharge to home which may not  coincide with above PT recommendations  However please refer to therapist recommendation for discharge planning given other factors that may influence destination  Adapted from Tenisha Rapp Use of 6-clicks to Provide Decision Support in the [facility] [place]  [address], [place]  AP[facility] [address] [place]         Pt would benefit from skilled inpatient PT during this admission in order to facilitate progress towards goals to maximize functional independence    Ellen Zee, PT, DPT

## 2021-09-19 NOTE — ASSESSMENT & PLAN NOTE
3 4 x 2 5 cm nodule in the right thyroid lobe  Incidental discovery of one or more thyroid nodule(s) measuring more than 1 5 cm and without suspicious features is noted in this patient who is above 28years old; according to guidelines published in the   February 2015 white paper on incidental thyroid nodules in the Journal of the [employer] of Radiology [facility]), further characterization with thyroid ultrasound is recommended    Lab Results   Component Value Date    [id number] 1 641 09/18/2021     Free T4: 1 06    Plan:  - thyroid u/s outpatient vs inpatient

## 2021-09-19 NOTE — CONSULTS
e-Consult ([facility])  - Interventional Radiology  Filomena Case 48 y o  female MRN: [id number]  Unit/Bed#: ED 08 Encounter: [id number]    IR has been consulted to evaluate the patient, determine the appropriate procedure, and whether or not a procedure can and should be performed regarding the care of Filomena Case  Consults  09/19/21      Assessment/Recommendation:   75-year-old female presenting to ED with unintentional weight loss, night sweats  CT was performed which shows diffuse lymphadenopathy  Patient will require lymph node biopsy for further workup  - plan for lymph node biopsy early this week, likely axillary lymph node is the safest area to biopsy  Total time spent in review of data, discussion with requesting provider and rendering advice was 10 min  Thank you for allowing Interventional Radiology to participate in the care of Filomena Case  Please don't hesitate to call or TigerText us with any questions       Patti Gomez MD

## 2021-09-19 NOTE — ASSESSMENT & PLAN NOTE
3 4 x 2 5 cm nodule in the right thyroid lobe  Incidental discovery of one or more thyroid nodule(s) measuring more than 1 5 cm and without suspicious features is noted in this patient who is above 28years old; according to guidelines published in the   February 2015 white paper on incidental thyroid nodules in the Journal of the [employer]iology Sabas Kathleen), further characterization with thyroid ultrasound is recommended    Lab Results   Component Value Date    [id number] 1 641 09/18/2021     Free T4: 1 06    Plan:  - thyroid u/s inpatient was benign with no need for biopsy or follow up

## 2021-09-19 NOTE — CONSULTS
Patient: Hitesh Valdez  Patient MRN: [id number]  Service date: 9/19/2021  Attending Physician:       CHIEF COMPLAIN  Chief Complaint   Patient presents with    Pain     Pt c/o bilateral hand pain, abd pain, and left foot pain since arriving on Tuesday  States she fell yesterday bc of pain, + headstrike, - LOC, - thinners  Heme / Oncology history:  Hitesh Valdez is a 48 y o  female     No prior hematology oncology issues      HISTORY OF PRESENT ILLNESS:  Hitesh Valdez is a 48 y o  female who was from [facility], has no major diagnosed health issues,  Presents with 1 year history of constitutional symptoms, 1 week history of diffuse arthralgia, presented to the hospital, imaging study showed diffuse lymphadenopathy and a large thyroid nodule, Oncology was consulted for comanagement    Patient reported has been slightly better since admission  Still have diffuse joint pain  Nonsmoker      ASSESSMENT/PLAN:  Hitesh Valdez is a 48 y o  female with:    1) diffuse lymphadenopathy:  CT on 09/18/2021 showed lymphadenopathy of bilateral axillary, hilar, mediastinum, retroperitoneum, and iliac chain, size measured to be 1 7-1 8 cm of iliac chain  Unclear etiology  2) pancytopenia - unclear etiology, mild-to-moderate, need further workup  3) thyroid nodule - recommend to proceed thyroid ultrasound as inpatient given the size of nodule  - lymphadenopathy could be due to underlying malignancy versus autoimmune disease versus chronic infection  Recommend to proceed with lymph node biopsy  To be noted patient does has a significantly elevated ESR suggesting inflammation  - cytopenias likely due to poor production  CT scan did not show major abnormalities of liver or spleen  TSH is within normal limit  Will proceed with workup including B12 level, SPEP, retic count         - recommend thyroid ultrasound            minutes were spent face to face with patient with greater than 50% of the time spent in counseling or coordination of care including discussions of treatment instructions  All of the patient's questions were answered to their satisfactory during this discussion  Colton Kauffman MD PhD  Hematology / Oncology              PROBLEM LIST:  Patient Active Problem List   Diagnosis    Pancytopenia [facility])    Thyroid nodule greater than or equal to 1 5 cm in diameter incidentally noted on imaging study    Generalized weakness    Pulmonary infiltrates    Unintentional weight loss    Transaminitis    Hypokalemia                     PAST MEDICAL HISTORY:   has a past medical history of Hypertension  PAST SURGICAL HISTORY:   has no past surgical history on file  CURRENT MEDICATIONS  Scheduled Meds:  Current Facility-Administered Medications   Medication Dose Route Frequency Provider Last Rate    acetaminophen  650 mg Oral Q6H PRN Marco Antonio Mitchell MD      enoxaparin  40 mg Subcutaneous Daily Dejah Bowling MD      gabapentin  100 mg Oral TID Parth Gutierrez MD      oxyCODONE  2 5 mg Oral Q6H PRN Marco Antonio Mitchell MD      sodium chloride  75 mL/hr Intravenous Continuous Dejah Bowling MD 75 mL/hr (09/18/21 0735)     Continuous Infusions:sodium chloride, 75 mL/hr, Last Rate: 75 mL/hr (09/18/21 0735)      PRN Meds:   acetaminophen    oxyCODONE    SOCIAL HISTORY:   reports that she has never smoked  She has never used smokeless tobacco  No history on file for alcohol use and drug use  FAMILY HISTORY:  family history is not on file  ALLERGIES:  has No Known Allergies  REVIEW OF SYSTEMS:  Please note that a 14-point review of systems was performed to include Constitutional, HEENT, Respiratory, CVS, GI, , Musculoskeletal, Integumentary, Neurologic, Rheumatologic, Endocrinologic, Psychiatric, Lymphatic, and Hematologic/Oncologic systems were reviewed and are negative unless otherwise stated in HPI   Positive and negative findings pertinent to this evaluation are incorporated into the history of present illness  PHYSICAL EXAMINATION:  Vital Signs: /81 (BP Location: Right arm)   Pulse 93   Temp 98 °F (36 7 °C) (Oral)   Resp 16   Ht 5' 5" (1 651 m)   Wt 58 1 kg (128 lb)   SpO2 100%   BMI 21 30 kg/m²   Body surface area is 1 64 meters squared  Ht Readings from Last 3 Encounters:   09/17/21 5' 5" (1 651 m)       Wt Readings from Last 3 Encounters:   09/17/21 58 1 kg (128 lb)        Temp Readings from Last 3 Encounters:   09/18/21 98 °F (36 7 °C) (Oral)        BP Readings from Last 3 Encounters:   09/19/21 122/81         Pulse Readings from Last 3 Encounters:   09/19/21 93       Clear palpable lymphadenopathy  Appears comfortable no other major finding      Constitutional: Alert and oriented    HEENT: Anicteric, PERRLA  Chest: Decreased breathing sound bilaterally, No wheezes/rales/rhonchi  CVS: Regular rhythm  Normal rate  Abdomen: Soft, nontender, nondistended  No palpable organomegaly  Extremities: No cyanosis/clubbing/edema  Integumentary: No obvious rashes or bruises  Musculoskeletal: No obvious bony or joint deformities  Psychiatric: Appropriate affect and mood  Lymph Node Survey: No palpable preauricular, submandibular, cervical, supraclavicular, axillary, epitrochlear or inguinal lymphadenopathy      LABS:  Results from last 7 days   Lab Units 09/18/21  0028 09/17/21  2236   CK TOTAL U/L  --  197*   TROPONIN I ng/mL <0 02  --    CK MB INDEX %  --  1 9     CBC with diff:   Results from last 7 days   Lab Units 09/19/21  0603 09/18/21  1518 09/18/21  0749 09/17/21  2236   WBC Thousand/uL 2 77*  --  2 61* 2 63*   HEMOGLOBIN g/dL 9 4*  --  10 0* 9 4*   HEMATOCRIT % 29 3*  --  30 8* 28 8*   MCV fL 94  --  94 93   PLATELETS Thousands/uL 74* 79* 76* 79*   MCH pg 30 1  --  30 6 30 3   MCHC g/dL 32 1  --  32 5 32 6   RDW % 25 5*  --  25 5* 25 6*   NRBC /100 WBC  --   --  1  --        CMP:  Results from last 7 days   Lab Units 09/19/21  0603 09/18/21  0750 09/17/21  4318   POTASSIUM mmol/L 3 8 3 3* 4 1   CHLORIDE mmol/L 108 106 106   CO2 mmol/L 26 26 27   BUN mg/dL 8 9 11   CREATININE mg/dL 0 73 0 67 0 77   CALCIUM mg/dL 8 1* 8 6 8 8   AST U/L 250* 228* 240*   ALT U/L 80* 76 79*   ALK PHOS U/L 186* 183* 187*   EGFR ml/min/1 73sq m 113 120 106                 Invalid input(s): TNI,  PCT        Results from last 7 days   Lab Units 09/18/21  1518   SED RATE mm/hour 106*       IMAGING:  CT head wo contrast   Final Result      No acute intracranial abnormality  Workstation performed: [id number]         PE Study with CT Abdomen and Pelvis with contrast   ED Interpretation   Adenopathy is seen throughout the visualized anatomy including in the bilateral axilla, bilateral divya, mediastinum, retroperitoneum, and bilateral iliac chain of unknown origin  Recommend clinical correlation      3 4 x 2 5 cm nodule in the right thyroid lobe  Incidental discovery of one or more thyroid nodule(s) measuring more than 1 5 cm and without suspicious features is noted in this patient who is above 28years old; according to guidelines published in the   February 2015 white paper on incidental thyroid nodules in the Journal of the [employer] Negra Rosales), further characterization with thyroid ultrasound is recommended      Bilateral upper lobe infiltrates larger on the right          No pulmonary embolus is seen  No aortic aneurysm or dissection      Final Result      Adenopathy is seen throughout the visualized anatomy including in the bilateral axilla, bilateral divya, mediastinum, retroperitoneum, and bilateral iliac chain of unknown origin  Recommend clinical correlation  3 4 x 2 5 cm nodule in the right thyroid lobe    Incidental discovery of one or more thyroid nodule(s) measuring more than 1 5 cm and without suspicious features is noted in this patient who is above 28years old; according to guidelines published in the    February 2015 white paper on incidental thyroid nodules in the Journal of the [employer]iology Maryjane Schwarz, further characterization with thyroid ultrasound is recommended  Bilateral upper lobe infiltrates larger on the right           No pulmonary embolus is seen  No aortic aneurysm or dissection             I personally discussed this study with Fidel Medina on 9/18/2021 at 3:42 AM                Workstation performed: [id number]         XR chest 1 view portable   ED Interpretation   Trachea midline, no bony abnormalities, cardiac silhouette is appropriately sized, costophrenic angles are well visualized as well as lung markings  Appears to be normal chest x-ray  Final Result      Right upper lobe pneumonia              Workstation performed: [id number]

## 2021-09-19 NOTE — ASSESSMENT & PLAN NOTE
Recent Labs     09/17/21  2236 09/18/21  0750 09/19/21  0603   * 228* 250*   ALT 79* 76 80*   ALKPHOS 187* 183* 186*   TBILI 0 37 0 60 0 56       Patient reports no significant alcohol use, no longer having abdominal pain   Has been taking herbal supplement since March 2021, unclear what is in the supplement, has not taken in a few weeks     Hep panel negative  HIV negative    Plan:  - monitor CMP   -CMV pending  - avoid hepatotoxins

## 2021-09-19 NOTE — ASSESSMENT & PLAN NOTE
Patient reports unintentional weight loss of approximately 20 lbs over the past year  Began seeing an herbal specialist in her home country of [facility] for these symptoms and was given an herbal supplement that she started taking in March of 2021  Unsure what is in the herbal supplement, but has not taken it over a few weeks  Endorses poor appetite prior to arrival in the [address] on Monday  Per her cousin at bedside, she has had a good appetite since arriving here  Herbalist also recommended the patient adhere to a vegetarian and gluten free diet, which the patient has been compliant with  States her weight loss began prior to making dietary changes       Plan:  - nutrition consult

## 2021-09-19 NOTE — PLAN OF CARE
Problem: PHYSICAL THERAPY ADULT  Goal: Performs mobility at highest level of function for planned discharge setting  See evaluation for individualized goals  Description: Treatment/Interventions: Functional transfer training, LE strengthening/ROM, Elevations, Therapeutic exercise, Endurance training, Patient/family training, Equipment eval/education, Bed mobility, Gait training  Equipment Recommended: Conan Boxer       See flowsheet documentation for full assessment, interventions and recommendations  Note: Prognosis: Good  Problem List: Decreased strength, Decreased endurance, Impaired balance, Decreased mobility, Impaired sensation, Pain  Assessment: Morgan Chin is a 48 y o  Female who [facility] on 9/17/2021 from home w/ multiple c/o including recent fall, B hand and foot weakness and N+T, unexplained weight loss, night sweats and diagnosis of generalized weakness  Extensive DDx w/ workup still being completed  Orders for PT eval and treat received, w/ activity orders of up and OOB as tolerated  Pt presents w/ comorbidities of pulmonary infiltrates, also apparently recent steroid use  Limited hx due to pt visiting the [address] [place] [address],[address]  Personal factors including: positive fall history  At baseline, pt mobilizes independently, and reports 1 falls in the last 6 months  Upon evaluation, pt presents w/ the following deficits: weakness, altered sensation, impaired balance, decreased endurance and pain limiting functional mobility  Pt requires mod I for bed mobility, S for transfers, and S for gait  Pt's clinical presentation is unstable/unpredictable due to need for assist w/ all phases of mobility when usually mobilizing independently, tolerance to only 25 feet of ambulation, pain impacting overall mobility status and recent history of falls  Pt is at an increased risk of falls due to physical deficits  Given the above findings, discharge recommendation is for Home w/ family support    During this admission, pt would benefit from skilled acute inpatient PT in order to address the abovementioned deficits to maximize function and mobility before DC from acute care  PT Discharge Recommendation: No rehabilitation needs (potentially OPPT pending mobility)          See flowsheet documentation for full assessment

## 2021-09-19 NOTE — PROGRESS NOTES
[facility]  Progress Note - Michell Ruiz 1975, 48 y o  female MRN: [id number]  Unit/Bed#: ED 08 Encounter: [id number]  Primary Care Provider: No primary care provider on file  Date and time admitted to hospital: 9/17/2021  8:48 PM    * Generalized weakness  Assessment & Plan  Patient endorses 20 lb weight loss over last year  Also endorses night sweats, forgetfulness, word finding difficulty, dry skin significant hair loss, arthralgias of the bilateral hands, wrists, toes, and left knee  No joint swelling on exam though significant tenderness of MCPs, wrists  Additionally, patient developed generalized weakness so severe that she sustained a fall with headstrike of the left temporal region, no loss of consciousness  Cough and congestion for last few days  Last year was put on Methotrexate and Prednisone as her doctor presumed RA, as a diagnosis of exclusion, patient didn't tolerate medications, only took for 5 months   Is fully vaccinated against COVID  Unsure of her childhood vaccine status  At this point, the etiology of the patient's symptoms is unclear  Patient is pancytopenic, Mild transaminitis,   CTA chest/abdomen/pelvis with no evidence of PE, though adenopathy is seen throughout the visualized anatomy including in the bilateral axilla, bilateral hilar, mediastinum, retroperitoneum, and bilateral iliac chain of unknown origin  CTH negative for intracranial pathology    Hepatitis panel nonreactive  HIV non reactive  Covid negative  retic %:2 61  Blood cx: NGTD  CRP and Sed rate both elevated  Positive D-dimer 3 73, CTA negative for PE  Serum protein 7 8 with low serum albumin of 2 2      Differential includes infectious etiology such as parvovirus B19, tuberculosis, EBV, CMV; autoimmune disease including but not limited to SLE, sarcoidosis, rheumatoid arthritis; and strong suspicion for possible hematologic malignancy given presence of B symptoms and diffuse lymphadenopathy  Patient with recent flight, decompression disease (Caison disease) could be contributing to arthralgias and abdominal pain  Plan:  - IVF with NS @ 75 cc/hr  - Infectious workup: EBV, CMP, RPR, quantiferon gold  - Autoimmune workup: CRP, ESR, PRATEEK, rheumatoid factor, anti CCP  - Neoplasm workup: SPEP, UPEP  -b12  -possible thyroid ultrasound  - infectious disease for further evaluation   -Heme/Onc to proceed with lymph node biopsy      Hypokalemia  Assessment & Plan  Resolved    Recent Labs     09/17/21 2236 09/18/21  0750 09/19/21  0603   K 4 1 3 3* 3 8         Transaminitis  Assessment & Plan  Recent Labs     09/17/21 2236 09/18/21  0750 09/19/21  0603   * 228* 250*   ALT 79* 76 80*   ALKPHOS 187* 183* 186*   TBILI 0 37 0 60 0 56       Patient reports no significant alcohol use, no longer having abdominal pain   Has been taking herbal supplement since March 2021, unclear what is in the supplement, has not taken in a few weeks  Hep panel negative  HIV negative    Plan:  - monitor CMP   -CMV pending  - avoid hepatotoxins       Unintentional weight loss  Assessment & Plan  Patient reports unintentional weight loss of approximately 20 lbs over the past year  Began seeing an herbal specialist in her home country of [facility] for these symptoms and was given an herbal supplement that she started taking in March of 2021  Unsure what is in the herbal supplement, but has not taken it over a few weeks  Endorses poor appetite prior to arrival in the [address] on Monday  Per her cousin at bedside, she has had a good appetite since arriving here  Herbalist also recommended the patient adhere to a vegetarian and gluten free diet, which the patient has been compliant with  States her weight loss began prior to making dietary changes  Plan:  - nutrition consult     Pulmonary infiltrates  Assessment & Plan  CTA chest/abd/pelvis: Bilateral upper lobe infiltrates larger on the right    Unclear if this is infectious in nature  Patient endorses mild cough and congestion for the past few days  Patient did receive ceftriaxone and doxycycline in the ED  Plan:  - hold antibiotics for now   - monitor for fever  - f/u quant gold   - lymph node biopsy    Thyroid nodule greater than or equal to 1 5 cm in diameter incidentally noted on imaging study  Assessment & Plan  3 4 x 2 5 cm nodule in the right thyroid lobe  Incidental discovery of one or more thyroid nodule(s) measuring more than 1 5 cm and without suspicious features is noted in this patient who is above 28years old; according to guidelines published in the   February 2015 white paper on incidental thyroid nodules in the Journal of the [employer] of [employer] [address]), further characterization with thyroid ultrasound is recommended  Lab Results   Component Value Date    [id number] 1 641 09/18/2021     Free T4: 1 06    Plan:  - thyroid u/s outpatient vs inpatient    Pancytopenia [facility])  Assessment & Plan  Patient with no active signs of bleeding, no petechiae or purpura  Unknown etiology, need to rule out hematologic malignancy, infectious etiology, autoimmune etiology    Recent Labs     09/17/21  2236 09/18/21  0749 09/19/21  0603   WBC 2 63* 2 61* 2 77*     Recent Labs     09/17/21  2236 09/18/21  0749 09/19/21  0603   HGB 9 4* 10 0* 9 4*     Recent Labs     09/18/21  0749 09/18/21  1518 09/19/21  0603   PLT 76* 79* 74*       Plan:  - trend CBC  - lymph node biopsy  - monitor H&H and transfuse for hgb <7        VTE Pharmacologic Prophylaxis: VTE Score: 3 Moderate Risk (Score 3-4) - Pharmacological DVT Prophylaxis Ordered: enoxaparin (Lovenox)  Patient Centered Rounds: I performed bedside rounds with nursing staff today  Discussions with Specialists or Other Care Team Provider: Heme/Onc note reviewed    Education and Discussions with Family / Patient: Updated  (daughter) at bedside  Time Spent for Care: 30 minutes   More than 50% of total time spent on counseling and coordination of care as described above  Current Length of Stay: 1 day(s)  Current Patient Status: Inpatient   Certification Statement: The patient will continue to require additional inpatient hospital stay due to ongoing workup  Discharge Plan: Anticipate discharge in 24-48 hrs to home  Code Status: Level 1 - Full Code    Subjective:   Patient feeling slightly better today  She states that overnight her right thumb hurt a lot and she received pain medication but it did not help  She states her fingers are swollen which she usually experiences all day every day  She still has a cough but no abdominal pain, cramping, vomiting, diarrhea  Objective:     Vitals:   Temp (24hrs), Av 9 °F (36 6 °C), Min:97 8 °F (36 6 °C), Max:98 °F (36 7 °C)    Temp:  [97 8 °F (36 6 °C)-98 °F (36 7 °C)] 98 °F (36 7 °C)  HR:  [] 102  Resp:  [16-18] 16  BP: (113-155)/(61-81) 139/70  SpO2:  [97 %-100 %] 97 %  Body mass index is 21 3 kg/m²  Input and Output Summary (last 24 hours):   No intake or output data in the 24 hours ending 21 1242    Physical Exam:   Physical Exam  Vitals and nursing note reviewed  Constitutional:       General: She is not in acute distress  Appearance: She is well-developed  HENT:      Head: Normocephalic and atraumatic  Mouth/Throat:      Mouth: Mucous membranes are moist    Eyes:      Extraocular Movements: Extraocular movements intact  Conjunctiva/sclera: Conjunctivae normal       Pupils: Pupils are equal, round, and reactive to light  Cardiovascular:      Rate and Rhythm: Normal rate and regular rhythm  Pulses: Normal pulses  Heart sounds: Normal heart sounds  No murmur heard  Pulmonary:      Effort: Pulmonary effort is normal  No respiratory distress  Breath sounds: Normal breath sounds  Abdominal:      General: Abdomen is flat  Bowel sounds are normal  There is no distension        Palpations: Abdomen is soft       Tenderness: There is no abdominal tenderness  Musculoskeletal:         General: Swelling (In fingers bilaterally) and tenderness (PIPs and DIPs) present  Cervical back: Neck supple  Right lower leg: No edema  Left lower leg: No edema  Skin:     General: Skin is warm and dry  Neurological:      Mental Status: She is alert and oriented to person, place, and time  Psychiatric:         Mood and Affect: Mood normal           Additional Data:     Labs:  Results from last 7 days   Lab Units 09/19/21  0603   WBC Thousand/uL 2 77*   HEMOGLOBIN g/dL 9 4*   HEMATOCRIT % 29 3*   PLATELETS Thousands/uL 74*   BANDS PCT % 2   LYMPHO PCT % 22   MONO PCT % 2*   EOS PCT % 0     Results from last 7 days   Lab Units 09/19/21  0603   SODIUM mmol/L 139   POTASSIUM mmol/L 3 8   CHLORIDE mmol/L 108   CO2 mmol/L 26   BUN mg/dL 8   CREATININE mg/dL 0 73   ANION GAP mmol/L 5   CALCIUM mg/dL 8 1*   ALBUMIN g/dL 2 2*   TOTAL BILIRUBIN mg/dL 0 56   ALK PHOS U/L 186*   ALT U/L 80*   AST U/L 250*   GLUCOSE RANDOM mg/dL 77                 Results from last 7 days   Lab Units 09/18/21  1826 09/17/21  2325   LACTIC ACID mmol/L  --  0 9   PROCALCITONIN ng/ml 0 17  --        Lines/Drains:  Invasive Devices     Peripheral Intravenous Line            Peripheral IV 09/18/21 Ventral (anterior); Left Arm 1 day                Imaging: Reviewed radiology reports from this admission including: chest xray, abdominal/pelvic CT and CT head    Recent Cultures (last 7 days):   Results from last 7 days   Lab Units 09/17/21  2325 09/17/21  2236   BLOOD CULTURE  No Growth at 24 hrs  No Growth at 24 hrs         Last 24 Hours Medication List:   Current Facility-Administered Medications   Medication Dose Route Frequency Provider Last Rate    acetaminophen  650 mg Oral Q6H PRN Enid Velez MD      enoxaparin  40 mg Subcutaneous Daily Ludy Aleman MD      gabapentin  100 mg Oral TID Hilario Lefort, MD      oxyCODONE  2 5 mg Oral Q6H PRN Rosio Mondragon MD      sodium chloride  75 mL/hr Intravenous Continuous Jose Beverly MD 75 mL/hr (09/18/21 4406)        Today, Patient Was Seen By: Mikael Benavides DO    **Please Note: This note may have been constructed using a voice recognition system  **

## 2021-09-19 NOTE — ASSESSMENT & PLAN NOTE
Patient with no active signs of bleeding, no petechiae or purpura  Unknown etiology, need to rule out hematologic malignancy, infectious etiology, autoimmune etiology    Recent Labs     09/17/21 2236 09/18/21  0749 09/19/21  0603   WBC 2 63* 2 61* 2 77*     Recent Labs     09/17/21 2236 09/18/21  0749 09/19/21  0603   HGB 9 4* 10 0* 9 4*     Recent Labs     09/18/21  0749 09/18/21  1518 09/19/21  0603   PLT 76* 79* 74*       Plan:  - trend CBC  - lymph node biopsy  - monitor H&H and transfuse for hgb <7

## 2021-09-19 NOTE — UTILIZATION REVIEW
Initial Clinical Review    Admission: Date/Time/Statement:   Admission Orders (From admission, onward)     Ordered        09/18/21 0511  Inpatient Admission  Once                   Orders Placed This Encounter   Procedures    Inpatient Admission     Standing Status:   Standing     Number of Occurrences:   1     Order Specific Question:   Level of Care     Answer:   Med Surg [16]     Order Specific Question:   Estimated length of stay     Answer:   More than 2 Midnights     Order Specific Question:   Certification     Answer:   I certify that inpatient services are medically necessary for this patient for a duration of greater than two midnights  See H&P and MD Progress Notes for additional information about the patient's course of treatment  ED Arrival Information     Expected Arrival Acuity    - 9/17/2021 18:44 Urgent         Means of arrival Escorted by Service Admission type    Walk-In Family Member Hospitalist Urgent         Arrival complaint    FEVER/SWOLLEN [facility] PAIN         Chief Complaint   Patient presents with    Pain     Pt c/o bilateral hand pain, abd pain, and left foot pain since arriving on Tuesday  States she fell yesterday bc of pain, + headstrike, - LOC, - thinners  Initial Presentation: 47 yo fem w/no pmhx to ED from home admitted as inpatient due to gen weakness  Presented with new bilat hand/wrist/toe/L knee arthralgias, crampy abd pain, gen weakness since she got to the [address] from [facility] on 9/13  C/o paresthesias of extremities and hyperpigmentation on R palm and thumb with darkening of her ears  Here visiting family  She fell 1d pta due to weakness and hit L temporal region with resultant L sided headache  C/o mild cough, congestion, fully vaccinated against COVID  Had 20 lb unintentional weight loss over the last year  +night sweats and significant hair loss in the last few months   Saw herbal practitioner in [facility] last year and was taking an herbal supplement until a few weeks ago  She follows a gluten free/vegetarian diet  Unclear cause of sx  Workup reveals pancytopenia, transaminitis, high prot, low alb, elevated d dimer  PE study shows adenopathy throughout  Exam reveals L frontotemporal ecchymosis, suprapubic tenderness to deep palp, rash on L palm/thumb, tender bilat joints  EKG nsr  Differential includes hepatitis, parvo, TB, EBV, CMV HIV, autoimmune process, and strong suspicion for hematologic malignancy  Could have Jonathan winkler due to her recent flight contributing to the arthralgias/abd pain  Plan: IVF, infectious and autoimmune/neoplastic workup  Consider ID and heme onc consults  Date: 9/19   Day 2: imaging shows pneumonia; workup still pending  admits to forgetfulness, word finding difficulty, and dry skin  Pt cites today that she was on methotrexate and prednisone x 5 months after which she could no longer tolerate it for arthritis  She could have Rheum Arthritis  Sent CCP test  Consider rheum consult as IP or outpt if other workups negative  Remains pancytopenic with transaminitis  Thyroid normal, hep and hiv negative  Cultures negative  Per onc: diffuse lymphadenopathy, pancytopenia, thyroid nodule of unclear cause, could be malignancy vs  Autoimmune vs  Chronic infection  Should do lymph node bx  Does not have significantly high ESR to suggest inflammation  Likely poor production leading to cytopenias  No liver/splenic abnormalities on imaging  Check b12, spep, retic , thyroid US  Per IR: will complete lymph node bx sometime this week  L ax node is the safest to biopsy       ED Triage Vitals [09/17/21 1915]   Temperature Pulse Respirations Blood Pressure SpO2   99 9 °F (37 7 °C) (!) 111 18 138/81 98 %      Temp Source Heart Rate Source Patient Position - Orthostatic VS BP Location FiO2 (%)   Oral Monitor Sitting Left arm --      Pain Score       6          Wt Readings from Last 1 Encounters:   09/19/21 61 9 kg (136 lb 7 4 oz)     Additional Vital Signs: Date/Time  Temp  Pulse  Resp  BP  MAP (mmHg)  SpO2  O2 Device  Patient Position - Orthostatic VS   09/19/21 16:41:04  98 9 °F (37 2 °C)  --  16  136/86  103  --  --  --   09/19/21 1609  98 6 °F (37 °C)  102  16  139/75  --  99 %  --  Lying   09/19/21 1522  99 °F (37 2 °C)  --  --  --  --  --  --  --   09/19/21 1223  --  102  16  139/70  --  97 %  None (Room air)  Lying   09/19/21 0559  --  93  16  122/81  --  100 %  None (Room air)  Lying   09/19/21 0305  --  90  18  119/75  --  100 %  None (Room air)  --   09/18/21 2346  98 °F (36 7 °C)  83  16  115/78  --  99 %  None (Room air)  --   09/18/21 2307  --  87  18  --  --  98 %  --  --   09/18/21 2031  --  96  18  113/73  --  98 %  None (Room air)  --   09/18/21 1751  97 8 °F (36 6 °C)  85  17  128/61  --  98 %  --  --   09/18/21 1645  --  88  17  137/72  --  98 %  --  --   09/18/21 1445  --  100  18  143/62  --  97 %  --  --   09/18/21 1330  --  92  --  155/78  107  --  --  --   09/18/21 1200  --  90  --  --  --  97 %  --  --   09/18/21 1145  --  88  --  --  --  97 %  --  --   09/18/21 1130  --  88  --  112/64  83  96 %  --  --   09/18/21 1115  --  90  --  --  --  --  --  --   09/18/21 1100  --  88  --  --  --  --  --  --   09/18/21 0845  --  104  18  114/70  --  98 %  --  --   09/18/21 0600  --  88  18  116/74  91  98 %  None (Room air)  --   09/18/21 0530  --  92  18  127/60  87  100 %  None (Room air)  --   09/18/21 0400  --  82  18  106/61  77  97 %  None (Room air)  --   09/18/21 0230  --  88  18  106/67  82  94 %  None (Room air)  Lying   09/18/21 0130  --  98  18  106/61  79  97 %  None (Room air)  Lying   09/18/21 0100  --  98  18  116/74  90  99 %  None (Room air)  Lying   09/18/21 0030  --  100  18  118/74  91  100 %  None (Room air)  Lying   09/18/21 0000  --  94  18  132/87  105  100 %  None (Room air)  Lying   09/17/21 2324  98 9 °F (37 2 °C)  88  18  121/79  96  100 %  None (Room air)  Lying   09/17/21 2200  --  100  16  143/88  108  100 %  None (Room air)  Lying   09/17/21 2130  --  96  16  120/77  94  99 %  None (Room air)  Lying       Pertinent Labs/Diagnostic Test Results:   CT head wo contrast   Final Result by Evelyn Kapoor MD (09/19 7586)      No acute intracranial abnormality  Workstation performed: [id number]         PE Study with CT Abdomen and Pelvis with contrast   ED Interpretation by Leonel Fisher DO (09/18 9750)   Adenopathy is seen throughout the visualized anatomy including in the bilateral axilla, bilateral divya, mediastinum, retroperitoneum, and bilateral iliac chain of unknown origin  Recommend clinical correlation      3 4 x 2 5 cm nodule in the right thyroid lobe  Incidental discovery of one or more thyroid nodule(s) measuring more than 1 5 cm and without suspicious features is noted in this patient who is above 28years old; according to guidelines published in the   February 2015 white paper on incidental thyroid nodules in the Journal of the [employer] of [facility]), further characterization with thyroid ultrasound is recommended      Bilateral upper lobe infiltrates larger on the right          No pulmonary embolus is seen  No aortic aneurysm or dissection      Final Result by Krunal Alfonso MD (09/18 9719)      Adenopathy is seen throughout the visualized anatomy including in the bilateral axilla, bilateral divya, mediastinum, retroperitoneum, and bilateral iliac chain of unknown origin  Recommend clinical correlation  3 4 x 2 5 cm nodule in the right thyroid lobe  Incidental discovery of one or more thyroid nodule(s) measuring more than 1 5 cm and without suspicious features is noted in this patient who is above 28years old; according to guidelines published in the    February 2015 white paper on incidental thyroid nodules in the Journal of the [employer] [facility]), further characterization with thyroid ultrasound is recommended        Bilateral upper lobe infiltrates larger on the right           No pulmonary embolus is seen  No aortic aneurysm or dissection             I personally discussed this study with Shaquille Hdez on 9/18/2021 at 3:42 AM                Workstation performed: [id number]         XR chest 1 view portable   ED Interpretation by Lan Graff DO (09/18 0113)   Trachea midline, no bony abnormalities, cardiac silhouette is appropriately sized, costophrenic angles are well visualized as well as lung markings  Appears to be normal chest x-ray  Final Result by Jillian Beyer MD (09/18 1102)      Right upper lobe pneumonia  Workstation performed: [id number]         US thyroid    (Results Pending)       IR biopsy lymph node    (Results Pending)       9/17 EKG Normal sinus rhythm  Low voltage QRS  Nonspecific T wave abnormality       Ref Range & Units 9/19/21 0603   Retic Ct Abs 96,341-23,405 81,700    Retic Ct Pct 0 37 - 1 87 % 2  61High           Specimen Collected: 09/19/21 06:03   Last Resulted: 09/19/21 12:16         0 Result Notes   Ref Range & Units 9/18/21 0750   RPR TITER (none) Reactive 8 dilsAbnormal           Specimen Collected: 09/18/21 07:50   Last Resulted: 09/19/21 13:01            0 Result Notes   Ref Range & Units 9/18/21 0750   RPR Non-Reactive ReactiveAbnormal           Specimen Collected: 09/18/21 07:50   Last Resulted: 09/19/21 13:01              Ref Range & Units 9/18/21 0750   Rapid HIV 1 AND 2 Non-Reactive Non-Reactive    HIV-1 P24 Ag Screen Non-Reactive Non-Reactive      Specimen Collected: 09/18/21 07:50   Last Resulted: 09/18/21 08:47       Multiple studies pending: parvo b12 IgG, IgM; CMV IgG, IgM; PRATEEK, FTA, leukemia flow cytometry; serum PEP; cyclic citrul peptide IgG    Results from last 7 days   Lab Units 09/17/21  2236   SARS-COV-2  Negative     Results from last 7 days   Lab Units 09/19/21  0603 09/18/21  1518 09/18/21  0749 09/17/21  2236   WBC Thousand/uL 2 77*  --  2 61* 2 63* HEMOGLOBIN g/dL 9 4*  --  10 0* 9 4*   HEMATOCRIT % 29 3*  --  30 8* 28 8*   PLATELETS Thousands/uL 74* 79* 76* 79*   BANDS PCT % 2  --  1  --      Results from last 7 days   Lab Units 09/19/21  0603   RETIC CT ABS  81,700   RETIC CT PCT % 2 61*     Results from last 7 days   Lab Units 09/19/21  0603 09/18/21  0750 09/17/21  2236   SODIUM mmol/L 139 139 139   POTASSIUM mmol/L 3 8 3 3* 4 1   CHLORIDE mmol/L 108 106 106   CO2 mmol/L 26 26 27   ANION GAP mmol/L 5 7 6   BUN mg/dL 8 9 11   CREATININE mg/dL 0 73 0 67 0 77   EGFR ml/min/1 73sq m 113 120 106   CALCIUM mg/dL 8 1* 8 6 8 8     Results from last 7 days   Lab Units 09/19/21  0603 09/18/21  0750 09/17/21  2236   AST U/L 250* 228* 240*   ALT U/L 80* 76 79*   ALK PHOS U/L 186* 183* 187*   TOTAL PROTEIN g/dL 7 8 8 5* 8 7*   ALBUMIN g/dL 2 2* 2 4* 2 4*   TOTAL BILIRUBIN mg/dL 0 56 0 60 0 37         Results from last 7 days   Lab Units 09/19/21  0603 09/18/21  0750 09/17/21  2236   GLUCOSE RANDOM mg/dL 77 78 88           Results from last 7 days   Lab Units 09/17/21  2236   CK TOTAL U/L 197*   CK MB INDEX % 1 9   CK MB ng/mL 3 8     Results from last 7 days   Lab Units 09/18/21  0028   TROPONIN I ng/mL <0 02     Results from last 7 days   Lab Units 09/17/21  2236   D-DIMER QUANTITATIVE ug/ml FEU 3 73*         Results from last 7 days   Lab Units 09/18/21  1518   TSH 3RD GENERATON uIU/mL 1 641     Results from last 7 days   Lab Units 09/18/21  1826   PROCALCITONIN ng/ml 0 17     Results from last 7 days   Lab Units 09/17/21  2325   LACTIC ACID mmol/L 0 9                     Results from last 7 days   Lab Units 09/18/21  0749   HEP B S AG  Non-reactive   HEP C AB  Non-reactive   HEP B C IGM  Non-reactive     Results from last 7 days   Lab Units 09/17/21  2236   LIPASE u/L 431*     Results from last 7 days   Lab Units 09/18/21  1518   CRP mg/L 18 2*   SED RATE mm/hour 106*         Results from last 7 days   Lab Units 09/17/21  2234   CLARITY UA  Clear   COLOR UA Yellow   SPEC GRAV UA  1 020   PH UA  6 5   GLUCOSE UA mg/dl Negative   KETONES UA mg/dl Negative   BLOOD UA  Trace*   PROTEIN UA mg/dl Negative   NITRITE UA  Negative   BILIRUBIN UA  Negative   UROBILINOGEN UA E U /dl 0 2   LEUKOCYTES UA  Negative   WBC UA /hpf None Seen   RBC UA /hpf None Seen   BACTERIA UA /hpf None Seen   EPITHELIAL CELLS WET PREP /hpf Occasional                 Results from last 7 days   Lab Units 09/18/21  0838 09/18/21  0750   ETHANOL LVL mg/dL  --  <3   ACETAMINOPHEN LVL ug/mL <2*  --                  Results from last 7 days   Lab Units 09/17/21  2325 09/17/21  2236   BLOOD CULTURE  No Growth at 24 hrs  No Growth at 24 hrs                 ED Treatment:   Medication Administration from 09/17/2021 1844 to 09/19/2021 1634       Date/Time Order Dose Route Action     09/18/2021 0112 HYDROmorphone (DILAUDID) injection 0 5 mg 0 5 mg Intravenous Given     09/18/2021 0116 sodium chloride 0 9 % bolus 1,000 mL 1,000 mL Intravenous New Bag     09/18/2021 0512 sodium chloride 0 9 % infusion 125 mL/hr Intravenous New Bag     09/18/2021 0157 iohexol (OMNIPAQUE) 350 MG/ML injection (SINGLE-DOSE) 100 mL 100 mL Intravenous Given     09/18/2021 0512 ceftriaxone (ROCEPHIN) 1 g/50 mL in dextrose IVPB 1,000 mg Intravenous New Bag     09/18/2021 0600 doxycycline (VIBRAMYCIN) 100 mg in sodium chloride 0 9 % 100 mL IVPB 100 mg Intravenous New Bag     09/19/2021 0919 enoxaparin (LOVENOX) subcutaneous injection 40 mg 40 mg Subcutaneous Given     09/18/2021 0930 potassium chloride (K-DUR,KLOR-CON) CR tablet 40 mEq 40 mEq Oral Given     09/19/2021 1608 gabapentin (NEURONTIN) capsule 100 mg 100 mg Oral Given     09/19/2021 0919 gabapentin (NEURONTIN) capsule 100 mg 100 mg Oral Given     09/18/2021 2026 gabapentin (NEURONTIN) capsule 100 mg 100 mg Oral Given     09/18/2021 1518 gabapentin (NEURONTIN) capsule 100 mg 100 mg Oral Given     09/19/2021 1608 acetaminophen (TYLENOL) tablet 650 mg 650 mg Oral Given     09/18/2021 2209 acetaminophen (TYLENOL) tablet 650 mg 650 mg Oral Given     09/18/2021 1410 acetaminophen (TYLENOL) tablet 650 mg 650 mg Oral Given     09/19/2021 1226 oxyCODONE (ROXICODONE) IR tablet 2 5 mg 2 5 mg Oral Given     09/19/2021 5989 oxyCODONE (ROXICODONE) IR tablet 2 5 mg 2 5 mg Oral Given     09/18/2021 1410 oxyCODONE (ROXICODONE) IR tablet 2 5 mg 2 5 mg Oral Given        Past Medical History:   Diagnosis Date    Hypertension      Present on Admission:   Pancytopenia ([facility] [address] )   Thyroid nodule greater than or equal to 1 5 cm in diameter incidentally noted on imaging study   Generalized weakness   Pulmonary infiltrates   Unintentional weight loss   Transaminitis      Admitting Diagnosis: Pneumonia [J18 9]  Transaminitis [R74 01]  Unintentional weight loss [R63 4]  Diffuse lymphadenopathy [R59 1]  Bilateral pulmonary infiltrates on chest x-ray [R91 8]  Generalized weakness [R53 1]  Thyroid nodule greater than or equal to 1 5 cm in diameter incidentally noted on imaging study [E04 1]  Age/Sex: 48 y o  female  Admission Orders:  Scheduled Medications:  enoxaparin, 40 mg, Subcutaneous, Daily  gabapentin, 100 mg, Oral, TID      Continuous IV Infusions:  sodium chloride, 75 mL/hr, Intravenous, Continuous      PRN Meds: SEE ED TX SECTION also  acetaminophen, 650 mg, Oral, Q6H PRN x 1 9/19  oxyCODONE, 2 5 mg, Oral, Q6H PRN x2 9/19    SCD  House diet    IP CONSULT TO NUTRITION SERVICES  IP CONSULT TO ONCOLOGY  INPATIENT CONSULT TO IR    Network Utilization Review Department  ATTENTION: Please call with any questions or concerns to [phone] and carefully listen to the prompts so that you are directed to the right person  All voicemails are confidential   Marval Shaper all requests for admission clinical reviews, approved or denied determinations and any other requests to dedicated fax number below belonging to the campus where the patient is receiving treatment   List of dedicated fax numbers for the Facilities:  FACILITY NAME UR FAX NUMBER   ADMISSION DENIALS (Administrative/Medical Necessity) [phone]   PARENT CHILD HEALTH (Maternity/NICU/Pediatrics) [address] [facility]  [phone]   Jen Patel [phone] [place] [address] [address] [facility] [place] [address] [phone]

## 2021-09-20 ENCOUNTER — APPOINTMENT (INPATIENT)
Dept: RADIOLOGY | Facility: HOSPITAL | Age: 46
DRG: 988 | End: 2021-09-20

## 2021-09-20 VITALS
DIASTOLIC BLOOD PRESSURE: 78 MMHG | BODY MASS INDEX: 22.74 KG/M2 | HEART RATE: 107 BPM | OXYGEN SATURATION: 100 % | WEIGHT: 136.47 LBS | RESPIRATION RATE: 18 BRPM | TEMPERATURE: 98 F | SYSTOLIC BLOOD PRESSURE: 123 MMHG | HEIGHT: 65 IN

## 2021-09-20 PROBLEM — E87.6 HYPOKALEMIA: Status: RESOLVED | Noted: 2021-09-18 | Resolved: 2021-09-20

## 2021-09-20 LAB
ALBUMIN SERPL BCP-MCNC: 2.3 G/DL (ref 3.5–5)
ALBUMIN SERPL ELPH-MCNC: 2.92 G/DL (ref 3.5–5)
ALBUMIN SERPL ELPH-MCNC: 36.1 % (ref 52–65)
ALBUMIN UR ELPH-MCNC: 100 %
ALP SERPL-CCNC: 183 U/L (ref 46–116)
ALPHA1 GLOB MFR UR ELPH: 0 %
ALPHA1 GLOB SERPL ELPH-MCNC: 0.25 G/DL (ref 0.1–0.4)
ALPHA1 GLOB SERPL ELPH-MCNC: 3.1 % (ref 2.5–5)
ALPHA2 GLOB MFR UR ELPH: 0 %
ALPHA2 GLOB SERPL ELPH-MCNC: 0.64 G/DL (ref 0.4–1.2)
ALPHA2 GLOB SERPL ELPH-MCNC: 7.9 % (ref 7–13)
ALT SERPL W P-5'-P-CCNC: 73 U/L (ref 12–78)
ANA HOMOGEN SER QL IF: NORMAL
ANA HOMOGEN TITR SER: NORMAL {TITER}
ANION GAP SERPL CALCULATED.3IONS-SCNC: 4 MMOL/L (ref 4–13)
AST SERPL W P-5'-P-CCNC: 235 U/L (ref 5–45)
B-GLOBULIN MFR UR ELPH: 0 %
B19V IGG SER IA-ACNC: 0.5 INDEX (ref 0–0.8)
B19V IGM SER IA-ACNC: 0.1 INDEX (ref 0–0.8)
BETA GLOB ABNORMAL SERPL ELPH-MCNC: 0.57 G/DL (ref 0.4–0.8)
BETA1 GLOB SERPL ELPH-MCNC: 7 % (ref 5–13)
BETA2 GLOB SERPL ELPH-MCNC: 6.1 % (ref 2–8)
BETA2+GAMMA GLOB SERPL ELPH-MCNC: 0.49 G/DL (ref 0.2–0.5)
BILIRUB SERPL-MCNC: 0.6 MG/DL (ref 0.2–1)
BUN SERPL-MCNC: 6 MG/DL (ref 5–25)
CALCIUM ALBUM COR SERPL-MCNC: 9.8 MG/DL (ref 8.3–10.1)
CALCIUM SERPL-MCNC: 8.4 MG/DL (ref 8.3–10.1)
CHLORIDE SERPL-SCNC: 105 MMOL/L (ref 100–108)
CMV IGG SERPL IA-ACNC: >10 U/ML (ref 0–0.59)
CMV IGM SERPL IA-ACNC: 43.9 AU/ML (ref 0–29.9)
CO2 SERPL-SCNC: 28 MMOL/L (ref 21–32)
CREAT SERPL-MCNC: 0.7 MG/DL (ref 0.6–1.3)
CRYOGLOB RF SER-ACNC: ABNORMAL [IU]/ML
ERYTHROCYTE [DISTWIDTH] IN BLOOD BY AUTOMATED COUNT: 24.7 % (ref 11.6–15.1)
GAMMA GLOB ABNORMAL SERPL ELPH-MCNC: 3.22 G/DL (ref 0.5–1.6)
GAMMA GLOB MFR UR ELPH: 0 %
GAMMA GLOB SERPL ELPH-MCNC: 39.8 % (ref 12–22)
GAMMA INTERFERON BACKGROUND BLD IA-ACNC: 0.06 IU/ML
GFR SERPL CREATININE-BSD FRML MDRD: 118 ML/MIN/1.73SQ M
GLUCOSE SERPL-MCNC: 78 MG/DL (ref 65–140)
HCT VFR BLD AUTO: 29.9 % (ref 34.8–46.1)
HGB BLD-MCNC: 9.7 G/DL (ref 11.5–15.4)
IGG/ALB SER: 0.56 {RATIO} (ref 1.1–1.8)
INTERPRETATION UR IFE-IMP: NORMAL
M TB IFN-G BLD-IMP: ABNORMAL
M TB IFN-G CD4+ BCKGRND COR BLD-ACNC: -0.01 IU/ML
M TB IFN-G CD4+ BCKGRND COR BLD-ACNC: 0 IU/ML
MCH RBC QN AUTO: 30.1 PG (ref 26.8–34.3)
MCHC RBC AUTO-ENTMCNC: 32.4 G/DL (ref 31.4–37.4)
MCV RBC AUTO: 93 FL (ref 82–98)
MITOGEN IGNF BCKGRD COR BLD-ACNC: 0.2 IU/ML
NRBC BLD AUTO-RTO: 0 /100 WBCS
PLATELET # BLD AUTO: 83 THOUSANDS/UL (ref 149–390)
POTASSIUM SERPL-SCNC: 3.8 MMOL/L (ref 3.5–5.3)
PROT PATTERN SERPL ELPH-IMP: ABNORMAL
PROT PATTERN UR ELPH-IMP: NORMAL
PROT SERPL-MCNC: 8.1 G/DL (ref 6.4–8.2)
PROT SERPL-MCNC: 8.5 G/DL (ref 6.4–8.2)
PROT UR-MCNC: 16 MG/DL
RBC # BLD AUTO: 3.22 MILLION/UL (ref 3.81–5.12)
RHEUMATOID FACT SER QL LA: POSITIVE
RYE IGE QN: POSITIVE
SODIUM SERPL-SCNC: 137 MMOL/L (ref 136–145)
WBC # BLD AUTO: 2.22 THOUSAND/UL (ref 4.31–10.16)

## 2021-09-20 PROCEDURE — 76942 ECHO GUIDE FOR BIOPSY: CPT | Performed by: RADIOLOGY

## 2021-09-20 PROCEDURE — 88341 IMHCHEM/IMCYTCHM EA ADD ANTB: CPT | Performed by: PATHOLOGY

## 2021-09-20 PROCEDURE — 88333 PATH CONSLTJ SURG CYTO XM 1: CPT | Performed by: PATHOLOGY

## 2021-09-20 PROCEDURE — 88185 FLOWCYTOMETRY/TC ADD-ON: CPT

## 2021-09-20 PROCEDURE — 38505 NEEDLE BIOPSY LYMPH NODES: CPT | Performed by: RADIOLOGY

## 2021-09-20 PROCEDURE — 88365 INSITU HYBRIDIZATION (FISH): CPT | Performed by: PATHOLOGY

## 2021-09-20 PROCEDURE — 38505 NEEDLE BIOPSY LYMPH NODES: CPT

## 2021-09-20 PROCEDURE — 07B53ZX EXCISION OF RIGHT AXILLARY LYMPHATIC, PERCUTANEOUS APPROACH, DIAGNOSTIC: ICD-10-PCS | Performed by: RADIOLOGY

## 2021-09-20 PROCEDURE — 83883 ASSAY NEPHELOMETRY NOT SPEC: CPT

## 2021-09-20 PROCEDURE — 85027 COMPLETE CBC AUTOMATED: CPT

## 2021-09-20 PROCEDURE — 88342 IMHCHEM/IMCYTCHM 1ST ANTB: CPT | Performed by: PATHOLOGY

## 2021-09-20 PROCEDURE — 88305 TISSUE EXAM BY PATHOLOGIST: CPT | Performed by: PATHOLOGY

## 2021-09-20 PROCEDURE — 99239 HOSP IP/OBS DSCHRG MGMT >30: CPT | Performed by: INTERNAL MEDICINE

## 2021-09-20 PROCEDURE — 86200 CCP ANTIBODY: CPT

## 2021-09-20 PROCEDURE — 80053 COMPREHEN METABOLIC PANEL: CPT

## 2021-09-20 PROCEDURE — 88184 FLOWCYTOMETRY/ TC 1 MARKER: CPT | Performed by: RADIOLOGY

## 2021-09-20 RX ORDER — LIDOCAINE WITH 8.4% SOD BICARB 0.9%(10ML)
SYRINGE (ML) INJECTION CODE/TRAUMA/SEDATION MEDICATION
Status: COMPLETED | OUTPATIENT
Start: 2021-09-20 | End: 2021-09-20

## 2021-09-20 RX ORDER — GABAPENTIN 100 MG/1
100 CAPSULE ORAL 3 TIMES DAILY
Qty: 90 CAPSULE | Refills: 0 | Status: SHIPPED | OUTPATIENT
Start: 2021-09-20 | End: 2021-09-20 | Stop reason: HOSPADM

## 2021-09-20 RX ORDER — GUAIFENESIN 600 MG
600 TABLET, EXTENDED RELEASE 12 HR ORAL EVERY 12 HOURS SCHEDULED
Status: DISCONTINUED | OUTPATIENT
Start: 2021-09-20 | End: 2021-09-20 | Stop reason: HOSPADM

## 2021-09-20 RX ORDER — GUAIFENESIN 600 MG
600 TABLET, EXTENDED RELEASE 12 HR ORAL EVERY 12 HOURS SCHEDULED
Qty: 10 TABLET | Refills: 0 | Status: SHIPPED | OUTPATIENT
Start: 2021-09-20 | End: 2021-09-25

## 2021-09-20 RX ADMIN — ACETAMINOPHEN 650 MG: 325 TABLET, FILM COATED ORAL at 16:24

## 2021-09-20 RX ADMIN — GABAPENTIN 100 MG: 100 CAPSULE ORAL at 09:25

## 2021-09-20 RX ADMIN — Medication 3 ML: at 14:12

## 2021-09-20 RX ADMIN — GABAPENTIN 100 MG: 100 CAPSULE ORAL at 16:24

## 2021-09-20 NOTE — DISCHARGE INSTR - AVS FIRST PAGE
Dear Rebecca Guerra,     It was our pleasure to care for you here at [facility], [email or web address]  It is our hope that we were always able to exceed the expected standards for your care during your stay  You were hospitalized due to joint pains, abdominal pain, generalized weakness, weight loss  You were cared for on the [facility] 4th floor by Nishant Hannon DO under the service of Malik with the [facility] who covers for your primary care physician (PCP), No primary care provider on file  , while you were hospitalized  If you have any questions or concerns related to this hospitalization, you may contact us at [phone]  For follow up as well as any medication refills, we recommend that you follow up with your primary care physician  A registered nurse will reach out to you by phone within a few days after your discharge to answer any additional questions that you may have after going home  However, at this time we provide for you here, the most important instructions / recommendations at discharge:     · Notable Medication Adjustments -   · Mucinex as needed for congestion  · Ibuprofen or Naproxen as needed for pain  · Please do not take tylenol  · Testing Required after Discharge -   · none  · Important follow up information -   · Please make an appointment with a primary care doctor after discharge  · You have many pending tests upon discharge, and your primary doctor will be able to keep you updated with this information  · Other Instructions -   · If you experience loss of consciousness, worsening symptoms etc, please return to the ED  · Please review this entire after visit summary as additional general instructions including medication list, appointments, activity, diet, any pertinent wound care, and other additional recommendations from your care team that may be provided for you        Sincerely,     Nishant Hannon DO

## 2021-09-20 NOTE — ASSESSMENT & PLAN NOTE
Patient with no active signs of bleeding, no petechiae or purpura  Unknown etiology, need to rule out hematologic malignancy, infectious etiology, autoimmune etiology    Recent Labs     09/18/21  0749 09/19/21  0603 09/20/21  0443   WBC 2 61* 2 77* 2 22*     Recent Labs     09/18/21  0749 09/19/21  0603 09/20/21  0443   HGB 10 0* 9 4* 9 7*     Recent Labs     09/18/21  1518 09/19/21  0603 09/20/21  0443   PLT 79* 74* 83*       Plan:  - lymph node biopsy results pending  - will follow with a new PCP on discharge

## 2021-09-20 NOTE — ASSESSMENT & PLAN NOTE
Recent Labs     09/18/21  0750 09/19/21  0603 09/20/21  0447   * 250* 235*   ALT 76 80* 73   ALKPHOS 183* 186* 183*   TBILI 0 60 0 56 0 60       Patient reports no significant alcohol use, no longer having abdominal pain   Has been taking herbal supplement since March 2021, unclear what is in the supplement, has not taken in a few weeks     Hep panel negative  HIV negative    Plan:  - CMV pending  - avoid hepatotoxins upon discharge

## 2021-09-20 NOTE — BRIEF OP NOTE (RAD/CATH)
INTERVENTIONAL RADIOLOGY PROCEDURE NOTE    Date: 9/20/2021    Procedure: Right axillary lymph node biopsy  Preoperative diagnosis:   1  Bilateral pulmonary infiltrates on chest x-ray    2  Diffuse lymphadenopathy    3  Pneumonia    4  Unintentional weight loss    5  Thyroid nodule greater than or equal to 1 5 cm in diameter incidentally noted on imaging study    6  Generalized weakness    7  Transaminitis         Postoperative diagnosis: Same  Surgeon: Sukhwinder Lobato MD     Assistant: None  No qualified resident was available  Blood loss: Minimal    Specimens: 4, 18 G 1 3 cm core right axillary lymph node biopsy  Findings: Right axillary lymph node enlargement, biopsy performed x 4  No immediate post procedure complications  Complications: None immediate      Anesthesia: local

## 2021-09-20 NOTE — DISCHARGE INSTRUCTIONS
Lymph Node Biopsy   AMBULATORY CARE:   What you need to know about a lymph node biopsy:  Lymph nodes are tiny round organs that help trap and fight infection  A biopsy is a procedure to remove all or part of a lymph node  After a lymph node is removed, it can be tested for infection, cancer, and other medical conditions  The results of these tests can help your healthcare provider decide if you need more tests or treatments  How to prepare for a lymph node biopsy: Your healthcare provider will talk to you about how to prepare for your biopsy  He or she may tell you not to eat or drink anything after midnight on the day of your biopsy  He or she will tell you what medicines to take or not take on the day of your biopsy  You may need to stop taking blood thinners or aspirin several days before your biopsy  You may be given contrast liquid or need MRI pictures during your biopsy  Tell your healthcare provider if you have an allergy to contrast liquid  Do not enter the biopsy room with anything metal  Metal can cause serious injury  Tell the healthcare provider if you have any metal in or on your body  Arrange for someone to drive you home and stay with you after your biopsy  Pancytopenia   AMBULATORY CARE:   Pancytopenia  is low levels of red blood cells, white blood cells, and platelets  Red blood cells carry oxygen to all the organs and tissues of your body  White blood cells help your body fight infection by attacking and killing germs  Platelets stop the bleeding when you are cut or injured  Pancytopenia increases your risk for infection and bleeding  Without treatment these problems can become life-threatening     Common signs and symptoms:   · Feeling tired, weak, dizzy, or short of breath    · Frequent fevers or infections    · Pale skin or purple or red dots on the skin    · Bleeding from the gums or nose, blood in bowel movements or urine, or heavy bleeding from a cut    · Heavy menstrual bleeding in females    · Bruising easily, or getting bruises without an injury    Call 911 for any of the following:   · You cannot be woken  · You have a seizure  · You have trouble breathing  · You cannot stop the bleeding from a wound even after you hold firm pressure for 10 minutes  Seek care immediately if:   · You have a fever or chills  · You feel dizzy or you faint  · You have blood in your bowel movements or urine  · Your heart is beating faster than usual     Contact your healthcare provider if:   · You have a rash or red or purple dots on your skin  · You feel more tired than usual      · You have questions or concerns about your condition or care  Treatment for pancytopenia:   · Medicines  may be given to treat the cause of pancytopenia  · Take your medicine as directed  Contact your healthcare provider if you think your medicine is not helping or if you have side effects  Tell him or her if you are allergic to any medicine  Keep a list of the medicines, vitamins, and herbs you take  Include the amounts, and when and why you take them  Bring the list or the pill bottles to follow-up visits  Carry your medicine list with you in case of an emergency  · A blood transfusion  can help increase red blood cell, white blood cell, and platelet levels  This may prevent bleeding or organ damage  This does not treat pancytopenia  Instead, a blood transfusion may keep you safe until the cause of pancytopenia is known  · A stem cell transplant  is a procedure to replace unhealthy stem cells with healthy cells  Stem cells are able to become all of the blood cells  Stem cells can also travel to your bone marrow and can become new bone marrow cells  Balance activity with rest:  Do activities when your energy levels are the highest  Know your limits and do not plan too many activities for one day  Rest when you need to  Prevent or control bleeding:   · Do not take aspirin or NSAIDs  These medicines can cause you to bleed and bruise more easily  · Use caution with skin and mouth care  Use a soft washcloth and a soft toothbrush  This can keep your skin and gums from bleeding  Keep your nails trimmed to prevent scratches  If you shave, use an electric shaver  · Apply firm, steady pressure to stop bleeding from a wound  Apply pressure with a clean gauze or towel for 5 to 10 minutes  Call 911 if bleeding becomes heavy or does not stop  · Do not play contact sports or do activities that can cause bleeding  Ask your healthcare provider what activities are safe for you to do  Prevent infection:   · Wash your hands often  Use an alcohol-based hand rub if soap and water are not available  · Stay away from crowds and anyone who may be sick  Ask your healthcare provider if you need to wear a mask in public places  · Eat a low-bacteria diet as directed  This will help decrease your risk for an infection  Choose, prepare, and cook foods that contain a low amount of bacteria  Examples include pasteurized milk, well-cooked meats, and cooked pasta  Ask your healthcare provider for more information about a low-bacteria diet  Follow up with your healthcare provider as directed: You will need to return for blood tests frequently  You may also need regular blood transfusions  Write down your questions so you remember to ask them during your visits  © Copyright [employer] 2021 Information is for End User's use only and may not be sold, redistributed or otherwise used for commercial purposes  All illustrations and images included in CareNotes® are the copyrighted property of A D A M [employer] Inc  or [facility]   The above information is an  only  It is not intended as medical advice for individual conditions or treatments  Talk to your doctor, nurse or pharmacist before following any medical regimen to see if it is safe and effective for you      Thyroid Nodules   WHAT YOU NEED TO KNOW:   What are thyroid nodules? Thyroid nodules are growths on your thyroid gland  Your thyroid makes hormones that help control your body temperature, heart rate, and growth  The hormones also control how fast your body uses food for energy  Some nodules are lumps of tissue, and others are filled with fluid  What increases my risk for thyroid nodules? A lack of iodine in the foods you eat is the most common cause of thyroid nodules  The following may increase your risk:  · Autoimmune thyroid disorders, such as Hashimoto disease    · Medical conditions, such as cancer, a thyroid infection, thyroid goiter, or a thyroid cyst    · A family history  of thyroid nodules or thyroid cancer    · Pregnancy  that causes your body to create more hormones    · Past radiation  treatment to your head or neck    What are the signs and symptoms of thyroid nodules? A small nodule may have no signs or symptoms  As your nodule grows, you may be able to see a lump on your neck  A large nodule may press on your airway or neck veins and cause the following:  · A cough or choking and hoarse voice    · Flushed face and swollen neck or neck veins    · Noisy, high-pitched breathing    · Pain when you swallow or trouble swallowing    · Trouble breathing when you lie down    How are thyroid nodules diagnosed? · Blood tests  are done to check the level of thyroid hormones in your body  A blood test may also show if you have an autoimmune disease that caused your nodules  · An ultrasound  uses sound waves to show pictures of your thyroid on a screen  · A fine-needle biopsy  is done to get a tissue sample from your thyroid gland to be tested  How are thyroid nodules treated? · Thyroid medicine  is given to bring your thyroid hormone levels back to a normal range  · Radioactive iodine  is given to damage cells in your thyroid gland and decrease the size of your nodules       · Laser ablation  is done to make your nodules smaller  Ask for more information about laser ablation  · Surgery  may be done to remove all or part of your thyroid gland  Surgery is done if your nodules are cancerous  Ask for more information about thyroid surgery  What can I do to manage my thyroid nodules? · Eat iodine-rich foods  Examples include fish, seaweed, dairy products, eggs, beans, and lean meat  Iodized salt also contains iodine  You may need to use iodized table salt when you cook and season your food  Iodine may be added to bread or to your drinking water  Ask for a list of foods that contain iodine, and ask how much iodine you need each day  · Go to follow-up appointments  Write down your questions so you remember to ask them during your visits  When should I contact my healthcare provider? · You have a new cough that does not improve  · You begin choking or have new or increased trouble swallowing  · Your voice becomes hoarse  · You are losing weight without trying  · You have questions or concerns about your condition or care  When should I seek immediate care or call 911? · You have sudden chest pain or trouble breathing  · Your symptoms worsen, even after you take your medicines  CARE AGREEMENT:   You have the right to help plan your care  Learn about your health condition and how it may be treated  Discuss treatment options with your healthcare providers to decide what care you want to receive  You always have the right to refuse treatment  The above information is an  only  It is not intended as medical advice for individual conditions or treatments  Talk to your doctor, nurse or pharmacist before following any medical regimen to see if it is safe and effective for you  © Copyright [employer] 2021 Information is for End User's use only and may not be sold, redistributed or otherwise used for commercial purposes   All illustrations and images included in CareNotes® are the copyrighted property of A D A M [employer] Inc  or [employer] Ochoa    Weakness   AMBULATORY CARE:   Weakness  is a loss of muscle strength  It may be caused by brain, nerve, or muscle problems  Physical and mental conditions such as heart problems, pregnancy, dehydration, or depression may also cause weakness  Reactions to certain drugs can cause weakness  Parts of your body may become weak if you need to wear a cast or splint or have been on bed rest for a long time  Call 911 for any of the following:   · You have any of the following signs of a stroke:      ? Numbness or drooping on one side of your face     ? Weakness in an arm or leg    ? Confusion or difficulty speaking    ? Dizziness, a severe headache, or vision loss    · You lose feeling in your weakened body area  · You have electric shock-like feelings down your arms and legs when you flex or move your neck  · You have sudden or increased trouble speaking, swallowing, or breathing  Seek care immediately if:   · You have severe pain in your back, arms, or legs that worsens  · You have sudden or worsened muscle weakness or loss of movement  · You are not able to control when you urinate or have a bowel movement  Contact your healthcare provider if:   · You feel depressed or anxious  · You have questions or concerns about your condition or care  Manage weakness:   · Use assistive devices as directed  These help protect you from injury  Examples include a walker or cane  Have someone install handrails in your home  These will help you get out of a bathtub or stand up from a toilet  Use a shower chair so you can sit while you shower  Sit down on the toilet or another chair to dry off and put on your clothes  Get help going up and down stairs if your legs are weak  · Go to physical or occupational therapy if directed  A physical therapist can teach you exercises to help strengthen weak muscles   An occupational therapist can show you ways to do your daily activities more easily  For example, light forks and spoons can be easier to use if you have hand weakness  You may also learn ways to organize your household items so you are not moving heavy items  · Balance rest with exercise  Exercise can help increase your muscle strength and energy  Do not exercise for long periods at a time  Take breaks often to rest  Too much exercise can cause muscle strain or make you more tired  Ask your healthcare provider how much exercise is right for you  · Eat a variety of healthy foods  Too much or too little food may cause weakness or tiredness  Ask your healthcare provider what a healthy amount of food is for you  Healthy foods include fruits, vegetables, whole-grain breads, low-fat dairy products, lean meats and fish, nuts, and cooked beans  · Do not smoke  Nicotine and other chemicals in cigarettes and cigars can make your symptoms worse, and can cause lung damage  Ask your healthcare provider for information if you currently smoke and need help to quit  E-cigarettes or smokeless tobacco still contain nicotine  Talk to your healthcare provider before you use these products  · Do not use caffeine, alcohol, or illegal drugs  These may cause muscle twitching, which could lead to worsened weakness  Follow up with your healthcare provider as directed:  Write down your questions so you remember to ask them during your visits  © Copyright [employer] 2021 Information is for End User's use only and may not be sold, redistributed or otherwise used for commercial purposes  All illustrations and images included in CareNotes® are the copyrighted property of A [employer] M , Inc  or [facility] [place]   The above information is an  only  It is not intended as medical advice for individual conditions or treatments   Talk to your doctor, nurse or pharmacist before following any medical regimen to see if it is safe and effective for you  What will happen during a lymph node biopsy: The type of biopsy may depend on the location of the lymph node or nodes to be removed  · In a needle biopsy,  you may be given local anesthesia to numb the biopsy area  With local anesthesia, you may still feel pressure or pushing during your procedure, but you should not feel any pain  You may also be given IV sedation to help you relax during the procedure  Your healthcare provider may use ultrasound, x-ray, MRI, or CT pictures to help find your lymph node  Once the lymph node is found, he or she will insert a needle into the lymph node and remove cells  Your healthcare provider may decide to have the cells tested immediately  He or she will remove the needle and cover the area with a bandage  · In an open biopsy,  you may be given general anesthesia to keep you asleep and free from pain  Your healthcare provider will make a small incision and remove part or all of the lymph node  Your healthcare provider may decide to have the lymph node tested immediately  Depending on the results, he or she may take more lymph nodes  When he or she is finished, he or she will close the incision with stitches or strips of medical tape  He or she will cover the closed incision with a bandage  What will happen after a lymph node biopsy:  Healthcare providers will monitor you until you are awake  You may be able to go home when you are awake and your pain is controlled  You may have swelling and bruising at the biopsy site  This is normal and expected  Risks of a lymph node biopsy: You may develop an infection or bleed more than expected  Nerves may be damaged during the biopsy  You may have swelling (lymphedema) if a group of lymph nodes are removed  This swelling may be permanent  Seek care immediately if:   · Blood soaks through your bandage  · Your stitches come apart  · Your bruise suddenly gets larger and feels hard      Contact your healthcare provider if:   · You have a fever or chills  · Your wound is red, swollen, or draining pus  · You have nausea or are vomiting  · Your skin is itchy, swollen, or you have a rash  · Your pain does not get better after you take medicine  · You have questions or concerns about your condition or care  Medicines: You may need any of the following:  · NSAIDs , such as ibuprofen, help decrease swelling, pain, and fever  This medicine is available with or without a doctor's order  NSAIDs can cause stomach bleeding or kidney problems in certain people  If you take blood thinner medicine, always ask your healthcare provider if NSAIDs are safe for you  Always read the medicine label and follow directions  · Acetaminophen  decreases pain and fever  It is available without a doctor's order  Ask how much to take and how often to take it  Follow directions  Read the labels of all other medicines you are using to see if they also contain acetaminophen, or ask your doctor or pharmacist  Acetaminophen can cause liver damage if not taken correctly  Do not use more than 4 grams (4,000 milligrams) total of acetaminophen in one day  · Prescription pain medicine  may be given  Ask your healthcare provider how to take this medicine safely  Some prescription pain medicines contain acetaminophen  Do not take other medicines that contain acetaminophen without talking to your healthcare provider  Too much acetaminophen may cause liver damage  Prescription pain medicine may cause constipation  Ask your healthcare provider how to prevent or treat constipation  · Take your medicine as directed  Contact your healthcare provider if you think your medicine is not helping or if you have side effects  Tell him or her if you are allergic to any medicine  Keep a list of the medicines, vitamins, and herbs you take  Include the amounts, and when and why you take them   Bring the list or the pill bottles to follow-up visits  Carry your medicine list with you in case of an emergency  Care for your wound as directed:  Ask your healthcare provider when your biopsy site can get wet  Carefully wash around the biopsy site with soap and water  It is okay to let soap and water gently run over your biopsy site  Do not  scrub your biopsy site  Gently pat dry the area and put on new, clean bandages as directed  Change your bandages when they get wet or dirty  If you have strips of medical tape, let them fall of on their own  It may take 10 to 14 days for them to fall off  Check your biopsy site every day for signs of infection, such as redness, swelling, or pus  Do not put powders or lotions on your biopsy site  If lymph nodes have been taken from your armpit, ask your healthcare provider when you can wear deodorant  Self-care:   · Apply ice  on your biopsy site for 15 to 20 minutes every hour or as directed  Use an ice pack, or put crushed ice in a plastic bag  Cover it with a towel before you apply it to your skin  Ice helps prevent tissue damage and decreases swelling and pain  · Elevate  your arm or leg nearest to the biopsy site as often as you can  This will help decrease swelling and pain  Prop your arm or leg on pillows or blankets to keep it elevated above the level of your heart comfortably  · Do not do strenuous activities  for 24 to 48 hours  Strenuous activities include heavy lifting, sports, or running  If lymph nodes were taken from your armpit, do not push or pull with your arm  These activities may put too much stress on your biopsy site  Rest and take short walks around the house  Ask your healthcare provider when you can return to your normal activities  · Drink plenty of liquids  as directed  This will help flush out contrast liquid from your body  Ask how much liquid to drink each day and which liquids are best for you      Ask your healthcare provider how to prevent lymphedema and infection: Lymphedema is fluid buildup in fatty tissues under your skin  Lymphedema may happen where lymph nodes were removed or in the arm or leg closest to this area  An infection in your skin can make lymphedema worse  Ask your healthcare provider how you can decrease your risk for skin infections and lymphedema  Follow up with your healthcare provider as directed: You may need more tests  Write down your questions so you remember to ask them during your visits  © Copyright [employer] 2021 Information is for End User's use only and may not be sold, redistributed or otherwise used for commercial purposes  All illustrations and images included in CareNotes® are the copyrighted property of [employer] M [employer]  or [employer] Miguel [place]   The above information is an  only  It is not intended as medical advice for individual conditions or treatments  Talk to your doctor, nurse or pharmacist before following any medical regimen to see if it is safe and effective for you

## 2021-09-20 NOTE — ASSESSMENT & PLAN NOTE
CTA chest/abd/pelvis: Bilateral upper lobe infiltrates larger on the right  Unclear if this is infectious in nature  Patient endorses mild cough and congestion for the past few days  Patient did receive ceftriaxone and doxycycline in the ED       Plan:  - no antibiotics since being at the ED  - quant gold is still pending on d/c  - lymph node biopsy results still pending

## 2021-09-20 NOTE — SEDATION DOCUMENTATION
Lymph node bx completed  Band aid to site  Patient tolerated well and transferred back to room in stable condition

## 2021-09-20 NOTE — DISCHARGE SUMMARY
[facility]  Discharge- [facility] 1975, 48 y o  female MRN: [id number]  Unit/Bed#: W -01 Encounter: [id number]  Primary Care Provider: No primary care provider on file  Date and time admitted to hospital: 9/17/2021  8:48 PM    * Generalized weakness  Assessment & Plan  Patient endorses 20 lb weight loss over last year  Also endorses night sweats, forgetfulness, word finding difficulty, dry skin significant hair loss, arthralgias of the bilateral hands, wrists, toes, and left knee  No joint swelling on exam though significant tenderness of MCPs, wrists  Additionally, patient developed generalized weakness so severe that she sustained a fall with headstrike of the left temporal region, no loss of consciousness  Cough and congestion for last few days  Last year was put on Methotrexate and Prednisone as her doctor presumed RA, as a diagnosis of exclusion, patient didn't tolerate medications, only took for 5 months   Is fully vaccinated against COVID  Unsure of her childhood vaccine status  At this point, the etiology of the patient's symptoms is unclear  Patient is pancytopenic, Mild transaminitis,   CTA chest/abdomen/pelvis with no evidence of PE, though adenopathy is seen throughout the visualized anatomy including in the bilateral axilla, bilateral hilar, mediastinum, retroperitoneum, and bilateral iliac chain of unknown origin     CTH negative for intracranial pathology    Hepatitis panel nonreactive  HIV non reactive  Covid negative  retic %:2 61  Blood cx: NGTD  CRP and Sed rate both elevated  Positive D-dimer 3 73, CTA negative for PE  PRATEEK positive  SPEP: showed faint possible band in gamma region, however immunofixation showed no monoclonal gammopathy  UPEP:  No monoclonal bands noted  Rheumatoid factor positive  Thyroid nodule ultrasound: no need for biopsy or follow up  RPR reactive, FTA abs still pending    Differential includes infectious etiology such as parvovirus B19, tuberculosis, EBV, CMV; autoimmune disease including but not limited to SLE, sarcoidosis, rheumatoid arthritis; and strong suspicion for possible hematologic malignancy given presence of B symptoms and diffuse lymphadenopathy  Patient with recent flight, decompression disease (Caison disease) could be contributing to arthralgias and abdominal pain  Plan:  -received IV hydration while inpatient    STILL PENDING:  - Infectious workup: EBV, CMV, quantiferon gold, FTA antibodies pending  - Autoimmune workup: CRP, ESR, anti CCP  -immunoglobulin free LT chains  -b12  -results of lymph node biopsy  -Leukemia/lymphoma flow cytometry      Transaminitis  Assessment & Plan  Recent Labs     09/18/21  0750 09/19/21  0603 09/20/21  0447   * 250* 235*   ALT 76 80* 73   ALKPHOS 183* 186* 183*   TBILI 0 60 0 56 0 60       Patient reports no significant alcohol use, no longer having abdominal pain   Has been taking herbal supplement since March 2021, unclear what is in the supplement, has not taken in a few weeks  Hep panel negative  HIV negative    Plan:  - CMV pending  - avoid hepatotoxins upon discharge      Unintentional weight loss  Assessment & Plan  Patient reports unintentional weight loss of approximately 20 lbs over the past year  Began seeing an herbal specialist in her [facility] for these symptoms and was given an herbal supplement that she started taking in March of 2021  Unsure what is in the herbal supplement, but has not taken it over a few weeks  Endorses poor appetite prior to arrival in the [address] [facility] [address] on Monday  Per her cousin at bedside, she has had a good appetite since arriving here  Herbalist also recommended the patient adhere to a vegetarian and gluten free diet, which the patient has been compliant with  States her weight loss began prior to making dietary changes       Plan:  -LN biopsy results pending on discharge  -Thyroid u/s results were benign, no need for follow up on discharge    Pulmonary infiltrates  Assessment & Plan  CTA chest/abd/pelvis: Bilateral upper lobe infiltrates larger on the right  Unclear if this is infectious in nature  Patient endorses mild cough and congestion for the past few days  Patient did receive ceftriaxone and doxycycline in the ED  Plan:  - no antibiotics since being at the ED  - quant gold is still pending on d/c  - lymph node biopsy results still pending    Thyroid nodule greater than or equal to 1 5 cm in diameter incidentally noted on imaging study  Assessment & Plan  3 4 x 2 5 cm nodule in the right thyroid lobe  Incidental discovery of one or more thyroid nodule(s) measuring more than 1 5 cm and without suspicious features is noted in this patient who is above 28years old; according to guidelines published in the   February 2015 white paper on incidental thyroid nodules in the Journal of the [employer] of [employer] Stacia Darnell), further characterization with thyroid ultrasound is recommended    Lab Results   Component Value Date    [id number] 1 641 09/18/2021     Free T4: 1 06    Plan:  - thyroid u/s inpatient was benign with no need for biopsy or follow up    Pancytopenia [facility])  Assessment & Plan  Patient with no active signs of bleeding, no petechiae or purpura  Unknown etiology, need to rule out hematologic malignancy, infectious etiology, autoimmune etiology    Recent Labs     09/18/21  0749 09/19/21  0603 09/20/21  0443   WBC 2 61* 2 77* 2 22*     Recent Labs     09/18/21  0749 09/19/21  0603 09/20/21  0443   HGB 10 0* 9 4* 9 7*     Recent Labs     09/18/21  1518 09/19/21  0603 09/20/21  0443   PLT 79* 74* 83*       Plan:  - lymph node biopsy results pending  - will follow with a new PCP on discharge    Hypokalemia-resolved as of 9/20/2021  Assessment & Plan  Resolved    Recent Labs     09/18/21  0750 09/19/21  0603 09/20/21  0447   K 3 3* 3 8 3 8           Medical Problems     Resolved Problems  Date Reviewed: 9/20/2021 Resolved    Hypokalemia 9/20/2021     Resolved by  Luis Bonner DO              Discharging Resident: DO Aaliyah Cliffordarging Attending: Concha Billings*  PCP: No primary care provider on file  Admission Date:   Admission Orders (From admission, onward)     Ordered        09/18/21 0511  Inpatient Admission  Once                   Discharge Date: 09/20/21    Consultations During Hospital Stay:  · Oncology  · Interventional radiology    Procedures Performed:   · Lymph node biopsy    Significant Findings / Test Results:   · Lymphadenopathy throughout the chest cavity including bilateral axilla, bilateral divya, mediastinum, retroperitoneum, bilateral iliac chain    Incidental Findings:   · 3 4 x 2 5 cm thyroid nodule in the right thyroid lobe- has been ultrasounded and found to have benign features none of which meet criteria for biopsy or follow-up ultrasound    Test Results Pending at Discharge (will require follow up): - Infectious workup: EBV, CMV, quantiferon gold, FTA antibodies  - Autoimmune workup: CRP, ESR, anti CCP  -immunoglobulin free LT chains  -B12  -results of lymph node biopsy  -Leukemia/lymphoma flow cytometry     Outpatient Tests Requested:  · None, discuss with PCP if CBC and BMP is necessary    Complications:  none    Reason for Admission: generalized weakness, arthralgias, abdominal pain    Hospital Course:   Wilfrido Monroy is a 48 y o  female patient who originally presented to the hospital on 9/17/2021 due to generalized weakness, unintentional weight loss, night sweats, hair loss, dry skin, arthralgias, numbness and tingling in her extremities, as well as abdominal pain  She had become so weak at home that she had sustained a fall  She is visiting her cousin here from [facility]  Most of the symptoms have started a week year ago however due to her fall she came in    She had been worked up in [facility] once before with no certain etiology, had begun methotrexate and prednisone however did not tolerate these medications well and stopped taking them in May 2020  While she was here she received gabapentin for pain and IV fluids  Oncology was consulted as well as IR to perform lymph node biopsy as there was extensive lymphadenopathy on CT abdomen, chest, pelvis  An extensive workup is being performed however not all tests are resulted on discharge, and etiology is unknown still the likely some aspect of rheumatological disease  Please see the above pending tests  Patient stable for discharge and instructed to follow with a primary care physician in order to follow the results of her blood work with eventual appropriate treatment  Please see above list of diagnoses and related plan for additional information  Condition at Discharge: fair    Discharge Day Visit / Exam:   Subjective:  Patient states she does not have any pain today  She is currently NPO for her IR biopsy  States she still has a slight cough and a tickle in her throat  Vitals: Blood Pressure: 123/78 (09/20/21 1401)  Pulse: (!) 107 (09/20/21 1401)  Temperature: 98 °F (36 7 °C) (09/19/21 2102)  Temp Source: Oral (09/19/21 2102)  Respirations: 18 (09/19/21 2102)  Height: 5' 5" (165 1 cm) (09/20/21 1132)  Weight - Scale: 61 9 kg (136 lb 7 4 oz) (09/20/21 1129)  SpO2: 100 % (09/20/21 1401)  Exam:   Physical Exam  Vitals and nursing note reviewed  Constitutional:       General: She is not in acute distress  Appearance: She is well-developed  HENT:      Head: Normocephalic and atraumatic  Mouth/Throat:      Mouth: Mucous membranes are moist    Eyes:      Extraocular Movements: Extraocular movements intact  Conjunctiva/sclera: Conjunctivae normal       Pupils: Pupils are equal, round, and reactive to light  Cardiovascular:      Rate and Rhythm: Normal rate and regular rhythm  Pulses: Normal pulses  Heart sounds: Normal heart sounds  No murmur heard       Pulmonary:      Effort: Pulmonary effort is normal  No respiratory distress  Breath sounds: Normal breath sounds  Abdominal:      General: Abdomen is flat  Bowel sounds are normal  There is no distension  Palpations: Abdomen is soft  Tenderness: There is no abdominal tenderness  Musculoskeletal:         General: No swelling or tenderness  Cervical back: Neck supple  Skin:     General: Skin is warm and dry  Neurological:      Mental Status: She is alert and oriented to person, place, and time  Psychiatric:         Mood and Affect: Mood normal           Discussion with Family: Patient declined call to   Discharge instructions/Information to patient and family:   See after visit summary for information provided to patient and family  Provisions for Follow-Up Care:  See after visit summary for information related to follow-up care and any pertinent home health orders  Disposition:   Home    Planned Readmission:  None    Discharge Medications:  See after visit summary for reconciled discharge medications provided to patient and/or family        **Please Note: This note may have been constructed using a voice recognition system**

## 2021-09-21 LAB
CCP AB SER IA-ACNC: 3.7
EBV NA IGG SER IA-ACNC: >600 U/ML (ref 0–17.9)
EBV VCA IGG SER IA-ACNC: >600 U/ML (ref 0–17.9)
EBV VCA IGM SER IA-ACNC: 38.1 U/ML (ref 0–35.9)
INTERPRETATION: ABNORMAL
KAPPA LC FREE SER-MCNC: 224.7 MG/L (ref 3.3–19.4)
KAPPA LC FREE/LAMBDA FREE SER: 1.15 {RATIO} (ref 0.26–1.65)
LAMBDA LC FREE SERPL-MCNC: 195.3 MG/L (ref 5.7–26.3)
T PALLIDUM AB SER QL IF: NON REACTIVE

## 2021-09-22 LAB
SCAN RESULT: NORMAL
SCAN RESULT: NORMAL

## 2021-09-23 LAB
BACTERIA BLD CULT: NORMAL
BACTERIA BLD CULT: NORMAL

## 2021-09-28 ENCOUNTER — TELEPHONE (OUTPATIENT)
Dept: OTHER | Facility: OTHER | Age: 46
End: 2021-09-28

## 2021-09-28 NOTE — TELEPHONE ENCOUNTER
Patient was seen at [facility] and had testing completed  Patient is calling for results  Patient does not have a [facility] provider  Patient advised to contact the ED since testing was ordered there to see if they can discuss her results with her  Please contact patient at [phone]

## 2021-10-14 ENCOUNTER — APPOINTMENT (OUTPATIENT)
Dept: LAB | Facility: CLINIC | Age: 46
End: 2021-10-14

## 2021-10-14 DIAGNOSIS — R74.8 ACID PHOSPHATASE ELEVATED: ICD-10-CM

## 2021-10-14 DIAGNOSIS — M25.50 ARTHRALGIA OF MULTIPLE JOINTS: ICD-10-CM

## 2021-10-14 DIAGNOSIS — D50.9 IRON DEFICIENCY ANEMIA, UNSPECIFIED IRON DEFICIENCY ANEMIA TYPE: ICD-10-CM

## 2021-10-14 DIAGNOSIS — R60.9 EDEMA, UNSPECIFIED TYPE: ICD-10-CM

## 2021-10-14 DIAGNOSIS — L63.1 ALOPECIA UNIVERSALIS: ICD-10-CM

## 2021-10-14 DIAGNOSIS — K90.3 PANCREATIC COLIPASE DEFICIENCY: ICD-10-CM

## 2021-10-14 LAB
ANISOCYTOSIS BLD QL SMEAR: PRESENT
BASOPHILS # BLD MANUAL: 0 THOUSAND/UL (ref 0–0.1)
BASOPHILS NFR MAR MANUAL: 0 % (ref 0–1)
EOSINOPHIL # BLD MANUAL: 0 THOUSAND/UL (ref 0–0.4)
EOSINOPHIL NFR BLD MANUAL: 0 % (ref 0–6)
ERYTHROCYTE [DISTWIDTH] IN BLOOD BY AUTOMATED COUNT: 18.7 % (ref 11.6–15.1)
HCT VFR BLD AUTO: 33.2 % (ref 34.8–46.1)
HGB BLD-MCNC: 10.6 G/DL (ref 11.5–15.4)
LYMPHOCYTES # BLD AUTO: 1.11 THOUSAND/UL (ref 0.6–4.47)
LYMPHOCYTES # BLD AUTO: 35 % (ref 14–44)
MCH RBC QN AUTO: 31.4 PG (ref 26.8–34.3)
MCHC RBC AUTO-ENTMCNC: 31.9 G/DL (ref 31.4–37.4)
MCV RBC AUTO: 98 FL (ref 82–98)
MONOCYTES # BLD AUTO: 0.16 THOUSAND/UL (ref 0–1.22)
MONOCYTES NFR BLD: 5 % (ref 4–12)
NEUTROPHILS # BLD MANUAL: 1.84 THOUSAND/UL (ref 1.85–7.62)
NEUTS BAND NFR BLD MANUAL: 2 % (ref 0–8)
NEUTS SEG NFR BLD AUTO: 56 % (ref 43–75)
PLATELET # BLD AUTO: 124 THOUSANDS/UL (ref 149–390)
PLATELET BLD QL SMEAR: ADEQUATE
PMV BLD AUTO: 12.9 FL (ref 8.9–12.7)
POIKILOCYTOSIS BLD QL SMEAR: PRESENT
POLYCHROMASIA BLD QL SMEAR: PRESENT
RBC # BLD AUTO: 3.38 MILLION/UL (ref 3.81–5.12)
TARGETS BLD QL SMEAR: PRESENT
VARIANT LYMPHS # BLD AUTO: 2 %
WBC # BLD AUTO: 3.18 THOUSAND/UL (ref 4.31–10.16)

## 2021-10-14 PROCEDURE — 83550 IRON BINDING TEST: CPT

## 2021-10-14 PROCEDURE — 85007 BL SMEAR W/DIFF WBC COUNT: CPT

## 2021-10-14 PROCEDURE — 83540 ASSAY OF IRON: CPT

## 2021-10-14 PROCEDURE — 87086 URINE CULTURE/COLONY COUNT: CPT

## 2021-10-14 PROCEDURE — 80076 HEPATIC FUNCTION PANEL: CPT

## 2021-10-14 PROCEDURE — 82728 ASSAY OF FERRITIN: CPT

## 2021-10-14 PROCEDURE — 36415 COLL VENOUS BLD VENIPUNCTURE: CPT

## 2021-10-14 PROCEDURE — 85027 COMPLETE CBC AUTOMATED: CPT

## 2021-10-14 PROCEDURE — 80048 BASIC METABOLIC PNL TOTAL CA: CPT

## 2021-10-15 LAB
ALBUMIN SERPL BCP-MCNC: 2.5 G/DL (ref 3.5–5)
ALP SERPL-CCNC: 101 U/L (ref 46–116)
ALT SERPL W P-5'-P-CCNC: 31 U/L (ref 12–78)
ANION GAP SERPL CALCULATED.3IONS-SCNC: 3 MMOL/L (ref 4–13)
AST SERPL W P-5'-P-CCNC: 87 U/L (ref 5–45)
BACTERIA UR CULT: NORMAL
BILIRUB DIRECT SERPL-MCNC: 0.12 MG/DL (ref 0–0.2)
BILIRUB SERPL-MCNC: 0.31 MG/DL (ref 0.2–1)
BUN SERPL-MCNC: 12 MG/DL (ref 5–25)
CALCIUM SERPL-MCNC: 9.2 MG/DL (ref 8.3–10.1)
CHLORIDE SERPL-SCNC: 107 MMOL/L (ref 100–108)
CO2 SERPL-SCNC: 28 MMOL/L (ref 21–32)
CREAT SERPL-MCNC: 0.64 MG/DL (ref 0.6–1.3)
FERRITIN SERPL-MCNC: 307 NG/ML (ref 8–388)
GFR SERPL CREATININE-BSD FRML MDRD: 122 ML/MIN/1.73SQ M
GLUCOSE SERPL-MCNC: 81 MG/DL (ref 65–140)
IRON SATN MFR SERPL: 21 % (ref 15–50)
IRON SERPL-MCNC: 58 UG/DL (ref 50–170)
POTASSIUM SERPL-SCNC: 4 MMOL/L (ref 3.5–5.3)
PROT SERPL-MCNC: 9.6 G/DL (ref 6.4–8.2)
SODIUM SERPL-SCNC: 138 MMOL/L (ref 136–145)
TIBC SERPL-MCNC: 275 UG/DL (ref 250–450)

## 2021-10-18 DIAGNOSIS — R53.1 GENERALIZED WEAKNESS: ICD-10-CM

## 2021-10-18 RX ORDER — GABAPENTIN 100 MG/1
CAPSULE ORAL
Qty: 90 CAPSULE | Refills: 0 | OUTPATIENT
Start: 2021-10-18

## 2021-11-10 ENCOUNTER — HOSPITAL ENCOUNTER (EMERGENCY)
Facility: HOSPITAL | Age: 46
Discharge: HOME | End: 2021-11-11
Attending: EMERGENCY MEDICINE | Admitting: EMERGENCY MEDICINE

## 2021-11-10 ENCOUNTER — APPOINTMENT (EMERGENCY)
Dept: RADIOLOGY | Facility: HOSPITAL | Age: 46
End: 2021-11-10

## 2021-11-10 ENCOUNTER — APPOINTMENT (EMERGENCY)
Dept: CT IMAGING | Facility: HOSPITAL | Age: 46
End: 2021-11-10

## 2021-11-10 VITALS
TEMPERATURE: 99.4 F | OXYGEN SATURATION: 98 % | DIASTOLIC BLOOD PRESSURE: 59 MMHG | SYSTOLIC BLOOD PRESSURE: 98 MMHG | HEART RATE: 113 BPM | RESPIRATION RATE: 18 BRPM

## 2021-11-10 DIAGNOSIS — R10.12 LEFT UPPER QUADRANT ABDOMINAL PAIN: Primary | ICD-10-CM

## 2021-11-10 LAB
ALBUMIN SERPL BCP-MCNC: 2.6 G/DL (ref 3.5–5)
ALP SERPL-CCNC: 76 U/L (ref 46–116)
ALT SERPL W P-5'-P-CCNC: 29 U/L (ref 12–78)
ANION GAP SERPL CALCULATED.3IONS-SCNC: 4 MMOL/L (ref 4–13)
APTT PPP: 31 SECONDS (ref 23–37)
AST SERPL W P-5'-P-CCNC: 115 U/L (ref 5–45)
BACTERIA UR QL AUTO: NORMAL /HPF
BASOPHILS # BLD MANUAL: 0 THOUSAND/UL (ref 0–0.1)
BASOPHILS NFR MAR MANUAL: 0 % (ref 0–1)
BILIRUB SERPL-MCNC: 0.23 MG/DL (ref 0.2–1)
BILIRUB UR QL STRIP: NEGATIVE
BUN SERPL-MCNC: 11 MG/DL (ref 5–25)
CALCIUM ALBUM COR SERPL-MCNC: 9.9 MG/DL (ref 8.3–10.1)
CALCIUM SERPL-MCNC: 8.8 MG/DL (ref 8.3–10.1)
CARDIAC TROPONIN I PNL SERPL HS: 9 NG/L
CHLORIDE SERPL-SCNC: 99 MMOL/L (ref 100–108)
CLARITY UR: CLEAR
CO2 SERPL-SCNC: 30 MMOL/L (ref 21–32)
COLOR UR: YELLOW
CREAT SERPL-MCNC: 0.96 MG/DL (ref 0.6–1.3)
D DIMER PPP FEU-MCNC: 3.3 UG/ML FEU
EOSINOPHIL # BLD MANUAL: 0 THOUSAND/UL (ref 0–0.4)
EOSINOPHIL NFR BLD MANUAL: 0 % (ref 0–6)
ERYTHROCYTE [DISTWIDTH] IN BLOOD BY AUTOMATED COUNT: 14.6 % (ref 11.6–15.1)
EXT PREG TEST URINE: NEGATIVE
EXT. CONTROL ED NAV: NORMAL
GFR SERPL CREATININE-BSD FRML MDRD: 81 ML/MIN/1.73SQ M
GLUCOSE SERPL-MCNC: 102 MG/DL (ref 65–140)
GLUCOSE UR STRIP-MCNC: NEGATIVE MG/DL
HCT VFR BLD AUTO: 31.9 % (ref 34.8–46.1)
HGB BLD-MCNC: 10.6 G/DL (ref 11.5–15.4)
HGB UR QL STRIP.AUTO: ABNORMAL
INR PPP: 1.04 (ref 0.84–1.19)
KETONES UR STRIP-MCNC: NEGATIVE MG/DL
LACTATE SERPL-SCNC: 1.6 MMOL/L (ref 0.5–2)
LEUKOCYTE ESTERASE UR QL STRIP: NEGATIVE
LIPASE SERPL-CCNC: 188 U/L (ref 73–393)
LYMPHOCYTES # BLD AUTO: 1.3 THOUSAND/UL (ref 0.6–4.47)
LYMPHOCYTES # BLD AUTO: 30 % (ref 14–44)
MCH RBC QN AUTO: 32.6 PG (ref 26.8–34.3)
MCHC RBC AUTO-ENTMCNC: 33.2 G/DL (ref 31.4–37.4)
MCV RBC AUTO: 98 FL (ref 82–98)
MONOCYTES # BLD AUTO: 0.22 THOUSAND/UL (ref 0–1.22)
MONOCYTES NFR BLD: 5 % (ref 4–12)
NEUTROPHILS # BLD MANUAL: 2.81 THOUSAND/UL (ref 1.85–7.62)
NEUTS BAND NFR BLD MANUAL: 1 % (ref 0–8)
NEUTS SEG NFR BLD AUTO: 64 % (ref 43–75)
NITRITE UR QL STRIP: NEGATIVE
NON-SQ EPI CELLS URNS QL MICRO: NORMAL /HPF
PH UR STRIP.AUTO: 7.5 [PH] (ref 4.5–8)
PLATELET # BLD AUTO: 154 THOUSANDS/UL (ref 149–390)
PLATELET BLD QL SMEAR: ADEQUATE
PMV BLD AUTO: 11.1 FL (ref 8.9–12.7)
POTASSIUM SERPL-SCNC: 4 MMOL/L (ref 3.5–5.3)
PROT SERPL-MCNC: 9.9 G/DL (ref 6.4–8.2)
PROT UR STRIP-MCNC: NEGATIVE MG/DL
PROTHROMBIN TIME: 13.6 SECONDS (ref 11.6–14.5)
RBC # BLD AUTO: 3.25 MILLION/UL (ref 3.81–5.12)
RBC #/AREA URNS AUTO: NORMAL /HPF
SODIUM SERPL-SCNC: 133 MMOL/L (ref 136–145)
SP GR UR STRIP.AUTO: 1.02 (ref 1–1.03)
UROBILINOGEN UR QL STRIP.AUTO: 0.2 E.U./DL
WBC # BLD AUTO: 4.33 THOUSAND/UL (ref 4.31–10.16)
WBC #/AREA URNS AUTO: NORMAL /HPF

## 2021-11-10 PROCEDURE — 81001 URINALYSIS AUTO W/SCOPE: CPT

## 2021-11-10 PROCEDURE — 71275 CT ANGIOGRAPHY CHEST: CPT

## 2021-11-10 PROCEDURE — 85027 COMPLETE CBC AUTOMATED: CPT | Performed by: EMERGENCY MEDICINE

## 2021-11-10 PROCEDURE — 85379 FIBRIN DEGRADATION QUANT: CPT | Performed by: EMERGENCY MEDICINE

## 2021-11-10 PROCEDURE — 36415 COLL VENOUS BLD VENIPUNCTURE: CPT | Performed by: EMERGENCY MEDICINE

## 2021-11-10 PROCEDURE — 80053 COMPREHEN METABOLIC PANEL: CPT | Performed by: EMERGENCY MEDICINE

## 2021-11-10 PROCEDURE — 84484 ASSAY OF TROPONIN QUANT: CPT | Performed by: EMERGENCY MEDICINE

## 2021-11-10 PROCEDURE — 99285 EMERGENCY DEPT VISIT HI MDM: CPT

## 2021-11-10 PROCEDURE — 85730 THROMBOPLASTIN TIME PARTIAL: CPT | Performed by: EMERGENCY MEDICINE

## 2021-11-10 PROCEDURE — 96374 THER/PROPH/DIAG INJ IV PUSH: CPT

## 2021-11-10 PROCEDURE — 81025 URINE PREGNANCY TEST: CPT | Performed by: EMERGENCY MEDICINE

## 2021-11-10 PROCEDURE — 85007 BL SMEAR W/DIFF WBC COUNT: CPT | Performed by: EMERGENCY MEDICINE

## 2021-11-10 PROCEDURE — 99285 EMERGENCY DEPT VISIT HI MDM: CPT | Performed by: EMERGENCY MEDICINE

## 2021-11-10 PROCEDURE — 96375 TX/PRO/DX INJ NEW DRUG ADDON: CPT

## 2021-11-10 PROCEDURE — G1004 CDSM NDSC: HCPCS

## 2021-11-10 PROCEDURE — 93005 ELECTROCARDIOGRAM TRACING: CPT

## 2021-11-10 PROCEDURE — 83605 ASSAY OF LACTIC ACID: CPT | Performed by: EMERGENCY MEDICINE

## 2021-11-10 PROCEDURE — 85610 PROTHROMBIN TIME: CPT | Performed by: EMERGENCY MEDICINE

## 2021-11-10 PROCEDURE — 83690 ASSAY OF LIPASE: CPT | Performed by: EMERGENCY MEDICINE

## 2021-11-10 PROCEDURE — 71045 X-RAY EXAM CHEST 1 VIEW: CPT

## 2021-11-10 PROCEDURE — 74177 CT ABD & PELVIS W/CONTRAST: CPT

## 2021-11-10 RX ORDER — ONDANSETRON 2 MG/ML
4 INJECTION INTRAMUSCULAR; INTRAVENOUS ONCE
Status: COMPLETED | OUTPATIENT
Start: 2021-11-10 | End: 2021-11-10

## 2021-11-10 RX ORDER — FENTANYL CITRATE 50 UG/ML
1 INJECTION, SOLUTION INTRAMUSCULAR; INTRAVENOUS ONCE
Status: COMPLETED | OUTPATIENT
Start: 2021-11-10 | End: 2021-11-10

## 2021-11-10 RX ORDER — HYDROMORPHONE HCL/PF 1 MG/ML
0.5 SYRINGE (ML) INJECTION ONCE
Status: COMPLETED | OUTPATIENT
Start: 2021-11-10 | End: 2021-11-10

## 2021-11-10 RX ADMIN — ONDANSETRON 4 MG: 2 INJECTION INTRAMUSCULAR; INTRAVENOUS at 20:56

## 2021-11-10 RX ADMIN — IOHEXOL 100 ML: 350 INJECTION, SOLUTION INTRAVENOUS at 22:45

## 2021-11-10 RX ADMIN — HYDROMORPHONE HYDROCHLORIDE 0.5 MG: 1 INJECTION, SOLUTION INTRAMUSCULAR; INTRAVENOUS; SUBCUTANEOUS at 20:56

## 2021-11-11 LAB
2HR DELTA HS TROPONIN: 23 NG/L
ATRIAL RATE: 115 BPM
CARDIAC TROPONIN I PNL SERPL HS: 32 NG/L
P AXIS: 45 DEGREES
PR INTERVAL: 160 MS
QRS AXIS: 40 DEGREES
QRSD INTERVAL: 72 MS
QT INTERVAL: 308 MS
QTC INTERVAL: 417 MS
T WAVE AXIS: 36 DEGREES
VENTRICULAR RATE: 110 BPM

## 2021-11-11 PROCEDURE — 93010 ELECTROCARDIOGRAM REPORT: CPT | Performed by: INTERNAL MEDICINE

## 2021-11-11 RX ORDER — FAMOTIDINE 20 MG/1
20 TABLET, FILM COATED ORAL 2 TIMES DAILY
Qty: 60 TABLET | Refills: 0 | Status: SHIPPED | OUTPATIENT
Start: 2021-11-11 | End: 2021-12-11